# Patient Record
Sex: FEMALE | Race: WHITE | NOT HISPANIC OR LATINO | URBAN - METROPOLITAN AREA
[De-identification: names, ages, dates, MRNs, and addresses within clinical notes are randomized per-mention and may not be internally consistent; named-entity substitution may affect disease eponyms.]

---

## 2018-11-19 ENCOUNTER — EMERGENCY (EMERGENCY)
Facility: HOSPITAL | Age: 63
LOS: 1 days | Discharge: HOME | End: 2018-11-19
Attending: EMERGENCY MEDICINE
Payer: COMMERCIAL

## 2018-11-19 VITALS
TEMPERATURE: 98 F | SYSTOLIC BLOOD PRESSURE: 109 MMHG | RESPIRATION RATE: 18 BRPM | OXYGEN SATURATION: 100 % | HEART RATE: 70 BPM | DIASTOLIC BLOOD PRESSURE: 60 MMHG

## 2018-11-19 VITALS
WEIGHT: 145.95 LBS | OXYGEN SATURATION: 99 % | RESPIRATION RATE: 16 BRPM | TEMPERATURE: 98 F | SYSTOLIC BLOOD PRESSURE: 98 MMHG | HEIGHT: 67 IN | DIASTOLIC BLOOD PRESSURE: 60 MMHG

## 2018-11-19 DIAGNOSIS — Z90.49 ACQUIRED ABSENCE OF OTHER SPECIFIED PARTS OF DIGESTIVE TRACT: Chronic | ICD-10-CM

## 2018-11-19 LAB
ALBUMIN SERPL ELPH-MCNC: 4.2 G/DL — SIGNIFICANT CHANGE UP (ref 3.3–5.2)
ALP SERPL-CCNC: 68 U/L — SIGNIFICANT CHANGE UP (ref 40–120)
ALT FLD-CCNC: 100 U/L — HIGH
AMPHET UR-MCNC: NEGATIVE — SIGNIFICANT CHANGE UP
ANION GAP SERPL CALC-SCNC: 12 MMOL/L — SIGNIFICANT CHANGE UP (ref 5–17)
APAP SERPL-MCNC: <7.5 UG/ML — LOW (ref 10–26)
APPEARANCE UR: CLEAR — SIGNIFICANT CHANGE UP
AST SERPL-CCNC: 154 U/L — HIGH
BARBITURATES UR SCN-MCNC: NEGATIVE — SIGNIFICANT CHANGE UP
BASOPHILS # BLD AUTO: 0 K/UL — SIGNIFICANT CHANGE UP (ref 0–0.2)
BASOPHILS # BLD AUTO: 0 K/UL — SIGNIFICANT CHANGE UP (ref 0–0.2)
BASOPHILS NFR BLD AUTO: 0.1 % — SIGNIFICANT CHANGE UP (ref 0–2)
BENZODIAZ UR-MCNC: NEGATIVE — SIGNIFICANT CHANGE UP
BILIRUB SERPL-MCNC: 0.5 MG/DL — SIGNIFICANT CHANGE UP (ref 0.4–2)
BILIRUB UR-MCNC: NEGATIVE — SIGNIFICANT CHANGE UP
BUN SERPL-MCNC: 15 MG/DL — SIGNIFICANT CHANGE UP (ref 8–20)
CALCIUM SERPL-MCNC: 9.5 MG/DL — SIGNIFICANT CHANGE UP (ref 8.6–10.2)
CHLORIDE SERPL-SCNC: 102 MMOL/L — SIGNIFICANT CHANGE UP (ref 98–107)
CK SERPL-CCNC: 80 U/L — SIGNIFICANT CHANGE UP (ref 25–170)
CO2 SERPL-SCNC: 26 MMOL/L — SIGNIFICANT CHANGE UP (ref 22–29)
COCAINE METAB.OTHER UR-MCNC: NEGATIVE — SIGNIFICANT CHANGE UP
COLOR SPEC: YELLOW — SIGNIFICANT CHANGE UP
CREAT SERPL-MCNC: 0.66 MG/DL — SIGNIFICANT CHANGE UP (ref 0.5–1.3)
DIFF PNL FLD: ABNORMAL
EOSINOPHIL # BLD AUTO: 0 K/UL — SIGNIFICANT CHANGE UP (ref 0–0.5)
EOSINOPHIL # BLD AUTO: 0 K/UL — SIGNIFICANT CHANGE UP (ref 0–0.5)
EOSINOPHIL NFR BLD AUTO: 0.1 % — SIGNIFICANT CHANGE UP (ref 0–6)
EPI CELLS # UR: SIGNIFICANT CHANGE UP
ETHANOL SERPL-MCNC: <10 MG/DL — SIGNIFICANT CHANGE UP
GLUCOSE SERPL-MCNC: 140 MG/DL — HIGH (ref 70–115)
GLUCOSE UR QL: NEGATIVE MG/DL — SIGNIFICANT CHANGE UP
HCT VFR BLD CALC: 33.7 % — LOW (ref 37–47)
HCT VFR BLD CALC: 35.2 % — LOW (ref 37–47)
HGB BLD-MCNC: 11 G/DL — LOW (ref 12–16)
HGB BLD-MCNC: 11.8 G/DL — LOW (ref 12–16)
KETONES UR-MCNC: NEGATIVE — SIGNIFICANT CHANGE UP
LEUKOCYTE ESTERASE UR-ACNC: ABNORMAL
LYMPHOCYTES # BLD AUTO: 0.5 K/UL — LOW (ref 1–4.8)
LYMPHOCYTES # BLD AUTO: 0.6 K/UL — LOW (ref 1–4.8)
LYMPHOCYTES # BLD AUTO: 3 % — LOW (ref 20–55)
LYMPHOCYTES # BLD AUTO: 4 % — LOW (ref 20–55)
MAGNESIUM SERPL-MCNC: 1.8 MG/DL — SIGNIFICANT CHANGE UP (ref 1.6–2.6)
MCHC RBC-ENTMCNC: 30.9 PG — SIGNIFICANT CHANGE UP (ref 27–31)
MCHC RBC-ENTMCNC: 31.2 PG — HIGH (ref 27–31)
MCHC RBC-ENTMCNC: 32.6 G/DL — SIGNIFICANT CHANGE UP (ref 32–36)
MCHC RBC-ENTMCNC: 33.5 G/DL — SIGNIFICANT CHANGE UP (ref 32–36)
MCV RBC AUTO: 93.1 FL — SIGNIFICANT CHANGE UP (ref 81–99)
MCV RBC AUTO: 94.7 FL — SIGNIFICANT CHANGE UP (ref 81–99)
METHADONE UR-MCNC: NEGATIVE — SIGNIFICANT CHANGE UP
MONOCYTES # BLD AUTO: 0.7 K/UL — SIGNIFICANT CHANGE UP (ref 0–0.8)
MONOCYTES # BLD AUTO: 1.1 K/UL — HIGH (ref 0–0.8)
MONOCYTES NFR BLD AUTO: 5.7 % — SIGNIFICANT CHANGE UP (ref 3–10)
MONOCYTES NFR BLD AUTO: 6 % — SIGNIFICANT CHANGE UP (ref 3–10)
NEUTROPHILS # BLD AUTO: 11.8 K/UL — HIGH (ref 1.8–8)
NEUTROPHILS # BLD AUTO: 15.6 K/UL — HIGH (ref 1.8–8)
NEUTROPHILS NFR BLD AUTO: 87 % — HIGH (ref 37–73)
NEUTROPHILS NFR BLD AUTO: 89.9 % — HIGH (ref 37–73)
NEUTS BAND # BLD: 4 % — SIGNIFICANT CHANGE UP (ref 0–8)
NITRITE UR-MCNC: NEGATIVE — SIGNIFICANT CHANGE UP
OPIATES UR-MCNC: NEGATIVE — SIGNIFICANT CHANGE UP
PCP SPEC-MCNC: SIGNIFICANT CHANGE UP
PCP UR-MCNC: NEGATIVE — SIGNIFICANT CHANGE UP
PH UR: 5 — SIGNIFICANT CHANGE UP (ref 5–8)
PHOSPHATE SERPL-MCNC: 2.5 MG/DL — SIGNIFICANT CHANGE UP (ref 2.4–4.7)
PLAT MORPH BLD: NORMAL — SIGNIFICANT CHANGE UP
PLATELET # BLD AUTO: 227 K/UL — SIGNIFICANT CHANGE UP (ref 150–400)
PLATELET # BLD AUTO: 235 K/UL — SIGNIFICANT CHANGE UP (ref 150–400)
POTASSIUM SERPL-MCNC: 3.4 MMOL/L — LOW (ref 3.5–5.3)
POTASSIUM SERPL-SCNC: 3.4 MMOL/L — LOW (ref 3.5–5.3)
PROT SERPL-MCNC: 6.8 G/DL — SIGNIFICANT CHANGE UP (ref 6.6–8.7)
PROT UR-MCNC: 15 MG/DL
RBC # BLD: 3.56 M/UL — LOW (ref 4.4–5.2)
RBC # BLD: 3.78 M/UL — LOW (ref 4.4–5.2)
RBC # FLD: 13.5 % — SIGNIFICANT CHANGE UP (ref 11–15.6)
RBC # FLD: 13.8 % — SIGNIFICANT CHANGE UP (ref 11–15.6)
RBC BLD AUTO: SIGNIFICANT CHANGE UP
RBC CASTS # UR COMP ASSIST: SIGNIFICANT CHANGE UP /HPF (ref 0–4)
SALICYLATES SERPL-MCNC: <0.6 MG/DL — LOW (ref 10–20)
SODIUM SERPL-SCNC: 140 MMOL/L — SIGNIFICANT CHANGE UP (ref 135–145)
SP GR SPEC: 1.02 — SIGNIFICANT CHANGE UP (ref 1.01–1.02)
THC UR QL: NEGATIVE — SIGNIFICANT CHANGE UP
TROPONIN T SERPL-MCNC: <0.01 NG/ML — SIGNIFICANT CHANGE UP (ref 0–0.06)
UROBILINOGEN FLD QL: NEGATIVE MG/DL — SIGNIFICANT CHANGE UP
WBC # BLD: 13.1 K/UL — HIGH (ref 4.8–10.8)
WBC # BLD: 17.4 K/UL — HIGH (ref 4.8–10.8)
WBC # FLD AUTO: 13.1 K/UL — HIGH (ref 4.8–10.8)
WBC # FLD AUTO: 17.4 K/UL — HIGH (ref 4.8–10.8)
WBC UR QL: SIGNIFICANT CHANGE UP

## 2018-11-19 PROCEDURE — 96374 THER/PROPH/DIAG INJ IV PUSH: CPT

## 2018-11-19 PROCEDURE — 82962 GLUCOSE BLOOD TEST: CPT

## 2018-11-19 PROCEDURE — 93010 ELECTROCARDIOGRAM REPORT: CPT

## 2018-11-19 PROCEDURE — 99285 EMERGENCY DEPT VISIT HI MDM: CPT

## 2018-11-19 PROCEDURE — 96361 HYDRATE IV INFUSION ADD-ON: CPT

## 2018-11-19 PROCEDURE — 82550 ASSAY OF CK (CPK): CPT

## 2018-11-19 PROCEDURE — 85027 COMPLETE CBC AUTOMATED: CPT

## 2018-11-19 PROCEDURE — 81001 URINALYSIS AUTO W/SCOPE: CPT

## 2018-11-19 PROCEDURE — 71045 X-RAY EXAM CHEST 1 VIEW: CPT

## 2018-11-19 PROCEDURE — 70450 CT HEAD/BRAIN W/O DYE: CPT | Mod: 26

## 2018-11-19 PROCEDURE — 83735 ASSAY OF MAGNESIUM: CPT

## 2018-11-19 PROCEDURE — 71045 X-RAY EXAM CHEST 1 VIEW: CPT | Mod: 26

## 2018-11-19 PROCEDURE — 93005 ELECTROCARDIOGRAM TRACING: CPT

## 2018-11-19 PROCEDURE — 84100 ASSAY OF PHOSPHORUS: CPT

## 2018-11-19 PROCEDURE — 36415 COLL VENOUS BLD VENIPUNCTURE: CPT

## 2018-11-19 PROCEDURE — 84484 ASSAY OF TROPONIN QUANT: CPT

## 2018-11-19 PROCEDURE — 70450 CT HEAD/BRAIN W/O DYE: CPT

## 2018-11-19 PROCEDURE — 80053 COMPREHEN METABOLIC PANEL: CPT

## 2018-11-19 PROCEDURE — 80307 DRUG TEST PRSMV CHEM ANLYZR: CPT

## 2018-11-19 PROCEDURE — 99284 EMERGENCY DEPT VISIT MOD MDM: CPT | Mod: 25

## 2018-11-19 RX ORDER — LEVETIRACETAM 250 MG/1
1000 TABLET, FILM COATED ORAL ONCE
Qty: 0 | Refills: 0 | Status: COMPLETED | OUTPATIENT
Start: 2018-11-19 | End: 2018-11-19

## 2018-11-19 RX ORDER — SODIUM CHLORIDE 9 MG/ML
1000 INJECTION INTRAMUSCULAR; INTRAVENOUS; SUBCUTANEOUS ONCE
Qty: 0 | Refills: 0 | Status: COMPLETED | OUTPATIENT
Start: 2018-11-19 | End: 2018-11-19

## 2018-11-19 RX ORDER — LEVETIRACETAM 250 MG/1
1 TABLET, FILM COATED ORAL
Qty: 60 | Refills: 0 | OUTPATIENT
Start: 2018-11-19 | End: 2018-12-18

## 2018-11-19 RX ORDER — CETIRIZINE HYDROCHLORIDE 10 MG/1
0 TABLET ORAL
Qty: 0 | Refills: 0 | COMMUNITY

## 2018-11-19 RX ADMIN — SODIUM CHLORIDE 1000 MILLILITER(S): 9 INJECTION INTRAMUSCULAR; INTRAVENOUS; SUBCUTANEOUS at 14:01

## 2018-11-19 RX ADMIN — SODIUM CHLORIDE 2000 MILLILITER(S): 9 INJECTION INTRAMUSCULAR; INTRAVENOUS; SUBCUTANEOUS at 14:03

## 2018-11-19 RX ADMIN — LEVETIRACETAM 400 MILLIGRAM(S): 250 TABLET, FILM COATED ORAL at 16:24

## 2018-11-19 RX ADMIN — SODIUM CHLORIDE 1000 MILLILITER(S): 9 INJECTION INTRAMUSCULAR; INTRAVENOUS; SUBCUTANEOUS at 15:03

## 2018-11-19 RX ADMIN — SODIUM CHLORIDE 1000 MILLILITER(S): 9 INJECTION INTRAMUSCULAR; INTRAVENOUS; SUBCUTANEOUS at 13:01

## 2018-11-19 NOTE — ED PROVIDER NOTE - PROGRESS NOTE DETAILS
Will recheck CBC s/p 2L NS. Patient has been asymptomatic during her ED stay. I discussed with Dr. Abreu who is comfortable with outpatient work up, will start patient on keppra, send Rx to pharmacy and I discussed with patient, daughter and mother who are all at bedside the importance of prompt follow up and compliance with medication. Repeat CBC shows improvement of WBC, still likely elevated due to stress reaction as there is no source of infection. Patient will be discharged home with follow up to Dr. Abreu tomorrow.

## 2018-11-19 NOTE — ED PROVIDER NOTE - OBJECTIVE STATEMENT
Patient with PMH migraine LEONARDO's presents complaining of 2 siezure-like episodes this morning which was witnessed by her mother who is at bedside. Patient states that this morning she felt "off" and complained of feeling dizzy. She went to sit down in the living room when her mother noticed that she stopped responding, her arms went straight out in front of her and started twisting, which lasted seconds. She did not fall as she was on the couch, but was noted to be sliding down the couch. She was repositioned by her daughter at that time when another episode occurred. She did not bit her tongue or lose control of her bladder or bowels. She felt "cloudy" afterwards, but was responsive and answering questions almost immediately afterwards. She denies any similar episodes in the past, takes only Excedrin for migraines (last dose was 2 days ago), denies any allergies to medications, denies smoking, EtOH or illicit drugs. She states she usually has to drive to [place] daily, but today she did not.

## 2018-11-19 NOTE — ED PROVIDER NOTE - PHYSICAL EXAMINATION
Const: Awake, alert and oriented. In no acute distress. Well appearing.  HEENT: NC/AT. Moist mucous membranes. No intraoral lacerations or abrasions.  Eyes: No scleral icterus. EOMI.  Neck:. Soft and supple. Full ROM without pain.  Cardiac: Regular rate and regular rhythm. +S1/S2. No murmurs. Peripheral pulses 2+ and symmetric. No LE edema.  Resp: Speaking in full sentences. No evidence of respiratory distress. No wheezes, rales or rhonchi.  Abd: Soft, non-tender, non-distended. Normal bowel sounds in all 4 quadrants. No guarding or rebound.  Back: Spine midline and non-tender. No CVAT.  Skin: No rashes, abrasions or lacerations.  Lymph: No cervical lymphadenopathy.  Neuro: CN II-XII grossly in tact. Symmetrical smile. PERRL. EOMI. Bilateral and symmetric sensation of face. Tongue midline. Normal finger to nose. Normal heel to shin. Normal rapid alternating movements. No pronator drift. Normal gait without assistance. Sensation symmetrically intact bilateral upper and lower extremities. Reflexes 2+ bilaterally. Babinski negative bilaterally.

## 2018-11-19 NOTE — ED ADULT NURSE NOTE - NSIMPLEMENTINTERV_GEN_ALL_ED
Implemented All Universal Safety Interventions:  [place] to call system. Call bell, personal items and telephone within reach. Instruct patient to call for assistance. Room bathroom lighting operational. Non-slip footwear when patient is off stretcher. Physically safe environment: no spills, clutter or unnecessary equipment. Stretcher in lowest position, wheels locked, appropriate side rails in place. Implemented All Fall Risk Interventions:  [place] to call system. Call bell, personal items and telephone within reach. Instruct patient to call for assistance. Room bathroom lighting operational. Non-slip footwear when patient is off stretcher. Physically safe environment: no spills, clutter or unnecessary equipment. Stretcher in lowest position, wheels locked, appropriate side rails in place. Provide visual cue, wrist band, yellow gown, etc. Monitor gait and stability. Monitor for mental status changes and reorient to person, place, and time. Review medications for side effects contributing to fall risk. Reinforce activity limits and safety measures with patient and family.

## 2018-11-19 NOTE — ED PROVIDER NOTE - NS ED ROS FT
Const: Denies fever, chills  HEENT: Denies blurry vision, sore throat  Neck: Denies neck pain/stiffness  Resp: Denies coughing, SOB  Cardiovascular: Denies CP, palpitations, LE edema  GI: Denies nausea, vomiting, abdominal pain, diarrhea, constipation, blood in stool  : Denies urinary frequency/urgency/dysuria, hematuria  MSK: Denies back pain  Neuro: + HA (last 2 days ago), + dizziness (resolved), + seizure-like activity. Denies numbness, weakness  Skin: Denies rashes.

## 2018-11-19 NOTE — ED ADULT NURSE NOTE - OBJECTIVE STATEMENT
pt reports sudden onset of dizziness, hands and feet tingling, "I knew something was going to happen" before having episode of stiffness, arms twisting outwards lasting few seconds. when episode ended pt was able to speak. pt presenting with headache at this time, denies headache earlier this morning prior to episode.

## 2018-11-19 NOTE — ED PROVIDER NOTE - MEDICAL DECISION MAKING DETAILS
Patient presents with 2 witnessed seizure-like activity with stiffness and twisting of the arms, but no tongue biting or loss of bladder/bowel control, no post-ictal period and no history. Neuro intact. Will CT head to r/o intracranial mass as cause of new onset seizure, but also check EKG and cardiac labs to r/o cardiac etiology of syncope, as well as drug screen and co-ingestants to r/o other cause of new onset seizure.

## 2018-11-19 NOTE — ED ADULT TRIAGE NOTE - CHIEF COMPLAINT QUOTE
Pt comes to ed from home for reported seizures x2. did not fall or hit head. no loc. patient reports feeling weakness in right arm since friday. patient also reports dizziness prior to seizure like episodes.

## 2018-12-13 ENCOUNTER — APPOINTMENT (OUTPATIENT)
Dept: NEUROSURGERY | Facility: CLINIC | Age: 63
End: 2018-12-13
Payer: COMMERCIAL

## 2018-12-13 ENCOUNTER — TRANSCRIPTION ENCOUNTER (OUTPATIENT)
Age: 63
End: 2018-12-13

## 2018-12-13 DIAGNOSIS — I67.1 CEREBRAL ANEURYSM, NONRUPTURED: ICD-10-CM

## 2018-12-13 DIAGNOSIS — Z87.891 PERSONAL HISTORY OF NICOTINE DEPENDENCE: ICD-10-CM

## 2018-12-13 DIAGNOSIS — R56.9 UNSPECIFIED CONVULSIONS: ICD-10-CM

## 2018-12-13 PROCEDURE — 99204 OFFICE O/P NEW MOD 45 MIN: CPT

## 2018-12-13 RX ORDER — LEVETIRACETAM 500 MG/1
500 TABLET, FILM COATED ORAL
Refills: 0 | Status: ACTIVE | COMMUNITY

## 2019-03-16 ENCOUNTER — TRANSCRIPTION ENCOUNTER (OUTPATIENT)
Age: 64
End: 2019-03-16

## 2023-02-17 ENCOUNTER — NON-APPOINTMENT (OUTPATIENT)
Age: 68
End: 2023-02-17

## 2023-02-19 ENCOUNTER — INPATIENT (INPATIENT)
Facility: HOSPITAL | Age: 68
LOS: 1 days | Discharge: HOME | DRG: 125 | End: 2023-02-21
Attending: NEUROLOGICAL SURGERY | Admitting: NEUROLOGICAL SURGERY
Payer: MEDICARE

## 2023-02-19 VITALS
TEMPERATURE: 99 F | RESPIRATION RATE: 16 BRPM | HEART RATE: 81 BPM | HEIGHT: 67 IN | SYSTOLIC BLOOD PRESSURE: 131 MMHG | DIASTOLIC BLOOD PRESSURE: 64 MMHG | OXYGEN SATURATION: 99 % | WEIGHT: 145.06 LBS

## 2023-02-19 DIAGNOSIS — Z90.49 ACQUIRED ABSENCE OF OTHER SPECIFIED PARTS OF DIGESTIVE TRACT: Chronic | ICD-10-CM

## 2023-02-19 LAB
ALBUMIN SERPL ELPH-MCNC: 3.8 G/DL — SIGNIFICANT CHANGE UP (ref 3.3–5.2)
ALP SERPL-CCNC: 78 U/L — SIGNIFICANT CHANGE UP (ref 40–120)
ALT FLD-CCNC: 17 U/L — SIGNIFICANT CHANGE UP
ANION GAP SERPL CALC-SCNC: 11 MMOL/L — SIGNIFICANT CHANGE UP (ref 5–17)
AST SERPL-CCNC: 22 U/L — SIGNIFICANT CHANGE UP
BASOPHILS # BLD AUTO: 0.03 K/UL — SIGNIFICANT CHANGE UP (ref 0–0.2)
BASOPHILS NFR BLD AUTO: 0.4 % — SIGNIFICANT CHANGE UP (ref 0–2)
BILIRUB SERPL-MCNC: <0.2 MG/DL — LOW (ref 0.4–2)
BUN SERPL-MCNC: 14.1 MG/DL — SIGNIFICANT CHANGE UP (ref 8–20)
CALCIUM SERPL-MCNC: 8.9 MG/DL — SIGNIFICANT CHANGE UP (ref 8.4–10.5)
CHLORIDE SERPL-SCNC: 104 MMOL/L — SIGNIFICANT CHANGE UP (ref 96–108)
CO2 SERPL-SCNC: 25 MMOL/L — SIGNIFICANT CHANGE UP (ref 22–29)
CREAT SERPL-MCNC: 0.74 MG/DL — SIGNIFICANT CHANGE UP (ref 0.5–1.3)
CRP SERPL-MCNC: <4 MG/L — SIGNIFICANT CHANGE UP
EGFR: 89 ML/MIN/1.73M2 — SIGNIFICANT CHANGE UP
EOSINOPHIL # BLD AUTO: 0.11 K/UL — SIGNIFICANT CHANGE UP (ref 0–0.5)
EOSINOPHIL NFR BLD AUTO: 1.5 % — SIGNIFICANT CHANGE UP (ref 0–6)
ERYTHROCYTE [SEDIMENTATION RATE] IN BLOOD: 20 MM/HR — SIGNIFICANT CHANGE UP (ref 0–20)
GLUCOSE SERPL-MCNC: 109 MG/DL — HIGH (ref 70–99)
HCT VFR BLD CALC: 34.9 % — SIGNIFICANT CHANGE UP (ref 34.5–45)
HGB BLD-MCNC: 11.7 G/DL — SIGNIFICANT CHANGE UP (ref 11.5–15.5)
IMM GRANULOCYTES NFR BLD AUTO: 0.1 % — SIGNIFICANT CHANGE UP (ref 0–0.9)
LYMPHOCYTES # BLD AUTO: 2.23 K/UL — SIGNIFICANT CHANGE UP (ref 1–3.3)
LYMPHOCYTES # BLD AUTO: 30.5 % — SIGNIFICANT CHANGE UP (ref 13–44)
MCHC RBC-ENTMCNC: 31.3 PG — SIGNIFICANT CHANGE UP (ref 27–34)
MCHC RBC-ENTMCNC: 33.5 GM/DL — SIGNIFICANT CHANGE UP (ref 32–36)
MCV RBC AUTO: 93.3 FL — SIGNIFICANT CHANGE UP (ref 80–100)
MONOCYTES # BLD AUTO: 0.68 K/UL — SIGNIFICANT CHANGE UP (ref 0–0.9)
MONOCYTES NFR BLD AUTO: 9.3 % — SIGNIFICANT CHANGE UP (ref 2–14)
NEUTROPHILS # BLD AUTO: 4.25 K/UL — SIGNIFICANT CHANGE UP (ref 1.8–7.4)
NEUTROPHILS NFR BLD AUTO: 58.2 % — SIGNIFICANT CHANGE UP (ref 43–77)
PLATELET # BLD AUTO: 270 K/UL — SIGNIFICANT CHANGE UP (ref 150–400)
POTASSIUM SERPL-MCNC: 3.6 MMOL/L — SIGNIFICANT CHANGE UP (ref 3.5–5.3)
POTASSIUM SERPL-SCNC: 3.6 MMOL/L — SIGNIFICANT CHANGE UP (ref 3.5–5.3)
PROT SERPL-MCNC: 6.5 G/DL — LOW (ref 6.6–8.7)
RBC # BLD: 3.74 M/UL — LOW (ref 3.8–5.2)
RBC # FLD: 13.8 % — SIGNIFICANT CHANGE UP (ref 10.3–14.5)
SODIUM SERPL-SCNC: 140 MMOL/L — SIGNIFICANT CHANGE UP (ref 135–145)
WBC # BLD: 7.31 K/UL — SIGNIFICANT CHANGE UP (ref 3.8–10.5)
WBC # FLD AUTO: 7.31 K/UL — SIGNIFICANT CHANGE UP (ref 3.8–10.5)

## 2023-02-19 PROCEDURE — G1004: CPT

## 2023-02-19 PROCEDURE — 70496 CT ANGIOGRAPHY HEAD: CPT | Mod: 26,MG

## 2023-02-19 PROCEDURE — 70498 CT ANGIOGRAPHY NECK: CPT | Mod: 26,MG

## 2023-02-19 PROCEDURE — 99285 EMERGENCY DEPT VISIT HI MDM: CPT

## 2023-02-19 NOTE — ED ADULT NURSE REASSESSMENT NOTE - NS ED NURSE REASSESS COMMENT FT1
pt continues to remain ambulatory around ED with steady gait. BRICEÑO with strength and purpose, even and unlabored resps present, neuro sx at bedside and recommending admission. pt with no change in mental status since arrival to ED. no chest pain, no ataxia, no slurred speech, neuro intact.
Ct completed and neuro sx consulted for eval prior to potential DC. pt is awake alert and oriented, no change in mental status, ambulatory to bathroom with steady gait. even and unlabored resps present, aware of plan of care. family at bedside, will monitor.

## 2023-02-19 NOTE — ED PROVIDER NOTE - ATTENDING CONTRIBUTION TO CARE
Pt states that she has a hx of L ICA aneurysm.  Pt states that she was diagnosed a few years ago and was supposed to get repeated follow-up for this but has yet to follow-up. Pt states that over the past year she has had gradually worsening decreased visual acuity in the L eye. Pt states that she made an appt with dr. mark for tuesday but has been having headaches for the past couple of day so she came to the ED.    physical - rrr. ctab. abd - soft, nt. no edema. no rash. neuro CN III-XII intact. normal gait. strength 5/5 x4. sensation intact x4.      plan - labs and imaging reviewed. L ICA aneurysm stable as compared to prior MRA.  Pt with progressively worsening vision in the L eye nothing acutely worse today.  neuro exam otherwise intact. no headache currently. ESR neg rules out temporal arteritis. Pt instructed to keep neurosurgery appt as well as f/up with ophtho and neurology.  Pt given a copy of all results and instructed to f/up with pcp regarding any abnormal results. Pt states that she has a hx of L ICA aneurysm.  Pt states that she was diagnosed a few years ago and was supposed to get repeated follow-up for this but has yet to follow-up. Pt states that over the past year she has had gradually worsening decreased visual acuity in the L eye. Pt states that she made an appt with dr. mark for tuesday but has been having headaches for the past couple of day so she came to the ED.    physical - rrr. ctab. abd - soft, nt. no edema. no rash. neuro CN III-XII intact. normal gait. strength 5/5 x4. sensation intact x4.      plan - labs and imaging reviewed. L ICA aneurysm stable as compared to prior MRA.  Pt with progressively worsening vision in the L eye nothing acutely worse today.  neurosurgery evaluated patient and recommends admission for MRI/MRA/angiography.  discussed with patient and amenable to admission.

## 2023-02-19 NOTE — ED ADULT NURSE NOTE - OBJECTIVE STATEMENT
pt ambulatory into ED with reports of headache and change in vision over the course of a year. pt with reports of known aneurysm, dx in 2019 but has not followed up. no injury no falls no reports of ataxia or diff speaking/ no neuro deficits, just reporting that her vision changed from "20/40 to 20/250 in a year." pt with steady gait, PERRLA x 2, no distress

## 2023-02-19 NOTE — ED ADULT TRIAGE NOTE - CHIEF COMPLAINT QUOTE
Patient ambulated into ED with steady gait, Pt c/o vision changes in left eye 20/40 to 20/250 in 1 year, noticed the change a few weeks ago also having on and off pain to left arm and leg, right sided headache on and off, pt has known aneurism on left side.

## 2023-02-19 NOTE — ED PROVIDER NOTE - NSFOLLOWUPINSTRUCTIONS_ED_ALL_ED_FT
-Follow up with Dr. Rhodes and bring these test results with you  -Return with any new/worse/concerning symptoms    Headache    A headache is pain or discomfort felt around the head or neck area. The specific cause of a headache may not be found as there are many types including tension headaches, migraine headaches, and cluster headaches. Watch your condition for any changes. Things you can do to manage your pain include taking over the counter and prescription medications as instructed by your health care provider, lying down in a dark quiet room, limiting stress, getting regular sleep, and refraining from alcohol and tobacco products.    SEEK IMMEDIATE MEDICAL CARE IF YOU HAVE ANY OF THE FOLLOWING SYMPTOMS: fever, vomiting, stiff neck, loss of vision, problems with speech, muscle weakness, loss of balance, trouble walking, passing out, or confusion.

## 2023-02-19 NOTE — ED PROVIDER NOTE - PROGRESS NOTE DETAILS
Mack: rechecked the patient. updated her on lab results. CTA pending. Mack: case discussed with nsgustavo Chew. Case will be d/w Dr. Jara.

## 2023-02-19 NOTE — ED PROVIDER NOTE - PHYSICAL EXAMINATION
General: well appearing, NAD  Head: NC, AT  EENT: EOMI, no scleral icterus, PERRL  Cardiac: RRR, no apparent murmurs, no lower extremity edema  Respiratory: CTABL, no respiratory distress   Abdomen: soft, ND, NT, nonperitonitic  MSK/Vascular: full ROM, soft compartments, warm extremities  Neuro: AAOx3, sensation to light touch intact all 4 extremities, normal FNF, 5/5 strength all 4 extremities  Psych: calm, cooperative

## 2023-02-19 NOTE — ED ADULT TRIAGE NOTE - SPO2 (%)
Patient education: Migraine headaches in adults       MIGRAINE HEADACHE OVERVIEW -- Headaches can be quite debilitating, although the vast majority are not due to life-threatening disorders. Approximately 90 percent of headaches are caused by one of three syndromes:   ?Migraine headache  ? Tension-type headaches  ? Cluster headaches  This article discusses migraine headaches in adults. MIGRAINE HEADACHE SYMPTOMS -- Between 12 and 16 percent of people in the [place] experience migraine headaches, making it the second most common type of headache. Pain -- The pain of a migraine headache usually begins gradually, intensifies over minutes to one or more hours, and resolves gradually at the end of the attack. The headache is typically dull, deep, and steady when mild to moderate in severity; it becomes throbbing or pulsatile when severe. Migraine headaches are worsened by light, sneezing, straining, constant motion, moving the head rapidly, or physical activity. Many migraine sufferers try to get relief by lying down in a darkened, quiet room. In 60 to 70 percent of people, the pain occurs on only one side of the head. In adults, a migraine headache usually lasts a few hours, although it can last from four to 72 hours. Other symptoms -- Migraine headaches are often accompanied by nausea and vomiting, as well as sensitivity to light and noise. Between 10 and 20 percent of people with migraines also experience nasal stuffiness and runny nose, or teary eyes. The symptoms of a migraine attack may be severe and alarming, but in most cases there are no lasting health effects when the attack ends. Aura -- About 20 percent of people with migraines experience symptoms before the headache; this is called an aura. The aura may include flashing lights or bright spots, zigzag lines, changes in vision, or numbness or tingling in the fingers of one hand, lips, tongue, or lower face.  You may have one or more of these aura symptoms. Auras may also involve other senses and can occasionally cause temporary muscle weakness or changes in speech; these symptoms can be frightening. Aura symptoms typically last five to 20 minutes and rarely last more than 60 minutes. The headache occurs soon after the aura stops. Muscle-related auras may last longer. MIGRAINE HEADACHE TRIGGERS -- Migraines can be triggered by stress, worry, menstrual periods, birth control pills, physical exertion, fatigue, lack of sleep, hunger, head trauma, and certain foods or drinks that contain chemicals such as nitrites, glutamate, aspartate, tyramine. Certain medications and chemicals can also trigger a migraine, including nitroglycerin (used to treat chest pain), estrogens, hydralazine (used to treat high blood pressure), perfumes, smoke, and organic solvents with a strong odor. Headache diary -- People who have frequent or severe headaches may benefit from keeping a headache diary over the course of one month. This can be used to determine what triggers the migraines and what makes them better. MIGRAINE HEADACHE TREATMENT TYPES -- Migraine headache treatment depends upon the frequency, severity, and symptoms of your headache. ? Acute treatment refers to medicines you can take when you have a headache to relieve the pain immediately. ?Preventive treatment refers to medicines you can take on a regular (usually daily) basis to prevent headaches in the future. Acute treatment -- The pain of migraines can be tough to get rid of. Treatment is most likely to work if you take it at the first sign of an attack (eg, at the first sign of aura if one occurs, or when pain begins). In some people, an aura occurs before the migraine (see 'Aura' above). Therefore, an aura can serve as a reliable warning that a migraine headache is on the way, and should be the signal to take migraine medication. (See \"Acute treatment of migraine in adults\". )  Pain relievers -- Mild migraine attacks may respond to pain relievers, some of which are available without a prescription. These drugs include:  ? Aspirin  ? Acetaminophen  ? Nonsteroidal anti-inflammatory drugs (NSAIDs) such as ibuprofen, indomethacin, or naproxen  ? Indomethacin is a prescription medicine that comes in a rectal suppository, which may be useful for people who have nausea during their headaches. Pain relievers are also available in combination with caffeine, which enhances their antimigraine effect. As an example, some pain relievers contain a combination of acetaminophen, aspirin, and caffeine. Pain relievers are often recommended first for mild to moderate migraine attacks. However, they should not be used too often because overuse can lead to medication-overuse headaches or chronic daily headaches. If you respond to a pain reliever, continue taking it with each attack, as long as you do not take it more than once or twice per week. People with gastritis (inflammation of the stomach), ulcers, kidney disease, and bleeding conditions should not take products containing aspirin or NSAIDs. Anti-nausea medications -- If you have nausea and vomiting with a migraine, you can take an anti-nausea medicine by injection or rectal suppository. In some cases, antimigraine drugs can be taken in combination with drugs that alleviate nausea and vomiting, such as metoclopramide or prochlorperazine. Anti-nausea medications given by mouth are usually used in combination with other medications to treat acute migraine. However, anti-nausea medications can be given alone in the hospital by intravenous and intramuscular administration to treat acute migraine headache. Triptans -- If a pain reliever does not control your migraine pain, most healthcare providers will recommend a treatment that is migraine-specific. This includes a class of medications called triptans.  Examples of triptans are sumatriptan, zolmitriptan, naratriptan, rizatriptan, almotriptan, eletriptan, and frovatriptan. Triptans can be used at home or work/school, and are all available in an oral (pill) form. Sumatriptan and zolmitriptan are available as nasal sprays, and sumatriptan is available as an injection. People with familial hemiplegic migraine, basilar migraine, uncontrolled high blood pressure, vascular disease (including ischemic stroke and coronary artery disease), Prinzmetal's angina, pregnancy, and severe kidney or liver disease should not take triptans in most cases. ?Sumatriptan -- Sumatriptan is available in many different formulas, including a tablet, nasal spray, and injection. Over 70 percent of people get pain relief within one hour of injecting sumatriptan; by two hours, 90 percent of people notice improvement. Your healthcare provider can help to decide which formula (pill, nasal spray, or shot) is best for you. Common side effects of injectable sumatriptan include pain at the injection site, dizziness, a feeling of warmth, and tingling in the arms or legs. Most of these reactions occur soon after the injection and resolve within 30 minutes. These drugs are safe for most patients. Sumatriptan nasal spray begins to work faster than the pill form and has fewer side effects than the injection. The most common side effect of the nasal spray is an unpleasant taste. A tablet that contains a combination of sumatriptan-naproxen appears to be more effective than each medication taken alone. It is not known if taking the two medications separately would be as effective as the combination tablet. Ergots -- Ergotamine is an older, migraine-specific drug. It is often combined with caffeine. Ergots are not usually as effective as triptans and are more likely to cause side effects. Ergots are sometimes recommended for people with migraines of a long duration (greater than 48 hours) or that frequently recur.  People with high blood pressure, coronary artery disease, or kidney or liver disease should not use ergotamines. Dihydroergotamine is related to ergotamine, and can be taken by nasal spray for mild or moderate migraine attacks. It can also be given by injection for severe attacks. Other medications -- Other medications for migraine are not as well studied or are less effective. A small percentage of people with migraine headaches do not respond to routine acute treatments and may require additional treatment for pain. Dexamethasone is a glucocorticoid (steroid) medication that can be given by injection, along with another acute migraine treatment, to reduce the risk of a migraine coming back. Dexamethasone injections may be given in an emergency department or clinic. Latest medications for acute treatment of migraine:    Ulbrelvy-CGRP inhibitor It is the first in a new class of medications known as oral CGRP (calcitonin gene related peptide). It is non-narcotic, oral treatment specifically designed to help relieve migraine pain plus its most bothersome symptoms (light sensitivity, sound sensitivity, and nausea). Nurtec-CGRP inhibitor  Reyvow is a serotonin 5-HT1F receptor agnoist. Do not administer unless patient can wait at least 8 hours between dosing and operating heavy machinery or driving. Consider use if triptans are contraindicated (cardiovascular risk factors), ineffective or poorly tolerated. Elyxyb oral solution. It is a EMMANUEL-2 selective NSAID, analgesic, nonopioid       Preventive treatment -- Preventive treatment effectively controls migraine headaches in most people, although the benefits of this treatment may not be evident for three to four weeks, or sometimes up to 3 months. In some cases, both acute treatment and preventive treatment are necessary to adequately control migraines. Beta blockers -- Beta blockers were originally developed to treat high blood pressure.  In addition, beta blockers reduce the frequency of migraine attacks in 60 to 80 percent of people. Commonly used beta blockers include propranolol, nadolol, atenolol, and metoprolol. Beta blockers may cause depression in some people or impotence in some men. Antidepressant medications -- Tricyclic antidepressants (TCAs) and certain other antidepressant medications are often recommended for migraine prevention. These include amitriptyline, nortriptyline, and doxepin. Of these, amitriptyline has proven benefit for migraine prevention, while there is less data for the other tricyclics. Side effects are common with tricyclic antidepressants. Most of these drugs cause drowsiness, particularly amitriptyline and doxepin. Therefore, these drugs are usually taken at bedtime and started at a low dose. Additional side effects of tricyclics can include dry mouth, constipation, palpitations, weight gain, blurred vision, and urinary retention. Confusion can occur, particularly in older adults. Anti-seizure medications -- The anti-seizure medications valproate (also called divalproex), gabapentin, and topiramate are sometimes used to prevent migraines. ?Valproate is an anti-seizure drug that seems to work as well as beta blockers for preventing migraine, and may be better tolerated. However, valproate can cause weight gain and hair loss. Women who are pregnant or sexually active and not using birth control (pills, condoms, etc) should not take valproate. ?Gabapentin was effective for reducing migraine headache frequency in a small clinical trial. Potential side effects include lightheadedness, drowsiness, dizziness, and balance problems. ?Topiramate is an anti-seizure drug that can help to prevent migraine. It can cause mild to moderate side effects that may include abnormal sensations (often tingling), fatigue, nausea, changes in taste, loss of appetite, diarrhea, and weight loss. More severe side effects can occur, including difficulty with thinking and concentration.   Calcium channel blockers -- Calcium channel blockers were developed to treat high blood pressure. Calcium channel blockers are widely used for migraine prevention. Examples of calcium channel blockers include verapamil and nifedipine extended-release. Verapamil is frequently used as a first choice for preventive migraine therapy because it is easy to use and has few side effects. Calcium channel blockers may lose their effectiveness over time, but this can sometimes be remedied by taking a higher dose of the drug or switching to a similar drug. Herbal therapies -- Herbal therapies have been evaluated for the treatment of migraine headache, including feverfew and butterbur. Of these, feverfew has been the most widely studied. Some studies have found it to be effective for migraine prevention, although most experts agree that the benefits are still unproven. Neither treatment is recommended. (Magnesium and Riboflavin) There is a non-prescription formula called Migrelief that combines, Magnesium, Riboflavin, and Fever Few available. Botulinum toxin A has been shown to be effective in prevention of chronic migraine. Indication  BOTOX® is a prescription medicine that is injected to prevent headaches in adults with chronic migraine who have 15 or more days each month with headache lasting 4 or more hours each day in people 18 years or older. It is a neuromuscular blocker agent. The latest migraine preventatives:    [address] is a type of preventative treatment that works by blocking Calcitonin Gene-Related Peptide (CGRP) receptor blocker. It is contraindicated in pregnancy and in allergies to latex or rubber. [address] is a self-injection that is taken as 1 dose, once a month. Injections are administered using the TuneCore autoinjector. Emgality was specifically developed to bind to CGRP, a substance in the brain that may play a key role in migraine.  Emgality is a humanized monoclonal antibody that binds to the CGRP ligand and blocks its binding to the receptor. Ajovy is a monoclonal antibody that selectively targets the CGRP ligand. All of the CGRP monoclonal Ab are contraindicated in pregnancy. Tin Deter is a CGRP receptor blocker. Vyepti binds to CGRP ligand and blocks the receptor. It is an IV infusion. It is infused over 30 minutes every 3 months. Nurtec which is used for acute migraines can also be used as a preventative. Alisonaby Sawyer is a CGRP receptor agonist    Other preventative agents:  Magnesium 400 mg each day  Riboflavin 300 mg each day  Butterbur  CoQ10 100 mg three times per day       Behaviorally, stress management strategies, such as exercise, relaxation techniques, biofeedback mechanisms, and other therapies designed to limit daily discomfort, may reduce the number and severity of migraine attacks. Avoiding medication overuse -- It is essential to use antimigraine medications according to the prescription and clinician's instructions. Overuse of certain medications for migraine, including over-the-counter drugs such as acetaminophen and nonsteroidal antiinflammatory drugs, or prescription drugs such as triptans, can lead to medication-overuse headaches (also called rebound headaches) and to a pattern of daily headaches that require increasing quantities of drugs for relief. A vicious cycle occurs when frequent headaches cause you to take medications, which then cause rebound headaches as the medications wear off, causing you to take more medication, and so on. Speak with a healthcare provider if your migraine treatment does not relieve your headaches or if you are having unpleasant medication side effects. Switching to another drug or switching from acute treatment to preventive treatment may be helpful. MENSTRUAL MIGRAINES -- Migraines occur about three times more commonly in women than in men.  Estrogen has a variable effect on the frequency and severity of a woman's migraines; some women who take birth control pills (which contain estrogen) or hormone replacement therapy experience worsening headaches, while others improve. Similarly, some women have more frequent or severe headaches during pregnancy while others have improvement. Menstrual migraines are migraine headaches that occur around the beginning of a woman's menstrual period (usually two days before to three days after the period begins). Women with menstrual migraine may also have migraines at other times during the month. Most often, there is no migraine aura associated with menstrual migraines, even if the woman usually has aura at other times. Menstrual migraines are thought to be triggered by the normal decrease in estrogen levels that occurs before the menstrual period begins. Menstrual migraines tend to be longer lasting, more severe, and more resistant to treatment than other types of migraine. Treatment -- Initial treatment of acute menstrual migraine is the same as treatment for migraine occurring at any other time. Preventive therapies for menstrual migraine can be either nonspecific (those that do not address the hormonal trigger) or specific (hormone-based treatments). With nonspecific strategies, success requires accurate anticipation of menses for scheduling interventions; therefore, women with irregular cycles are not good candidates for these options. Coexisting problems, such as dysmenorrhea, menorrhagia, endometriosis, as well as contraception needs may influence choice of preventive therapy. A preventive treatment may be useful for women who have menstrual migraines on a predictable schedule. This treatment strategy is called \"mini-prophylaxis\". ?Nonsteroidal antiinflammatory drugs (NSAIDs) such as ibuprofen or naproxen are one option for mini-prophylaxis of menstrual migraine. ?Triptans such as frovatriptan, sumatriptan, or naratriptan are another option.  Typically, long-acting triptans are dosed twice daily beginning two days before anticipated menses and continued for five days. Hormonal treatments may be recommended to prevent menstrual migraines. One approach is to use estrogen-progestin contraceptive pills in an extended cycle; another choice is menstrually-targeted supplemental estrogen. These treatments work by preventing a rapid decline in the level of estrogen in the body before the menstrual period, which is believed to trigger the migraine. However, some experts avoid treatment with estrogen-progestin contraceptives for women who have a menstrual migraine with aura. Others consider the use of such treatment only for healthy women younger than age 28 who do not have focal neurologic signs and who do not smoke. WHERE TO GET MORE INFORMATION -- Your healthcare provider is the best source of information for questions and concerns related to your medical problem    Organizations  [employer] Head[employer] Foundation  Non-profit organization dedicated to service headache sufferers, their families, and the healthcare practitioners who treat them. Promotes research into headache causes and treatments and educates the public. [address]   Uriel@[email or web address] org   [employer] Radha Hema   Tel: [phone])   Fax: [phone]   [address]  Assists migraine sufferers by providing information and support and by raising money to fund innovative research into its causes and better treatments. [address] [facility] [email or web address]   [email or web address]. [email or web address]   Tel: [phone]   Fax: [phone] Headache Society Committee for Headache Education (ACHE)  The American Headache Society Committee on Headache Education (ACHE) is a nonprofit patient-health professional partnership dedicated to advancing the treatment and management of patients with headache. [address] [facility]. [place], [address]   [email or web address]   [employer] org   Tel: [phone]

## 2023-02-19 NOTE — ED PROVIDER NOTE - CLINICAL SUMMARY MEDICAL DECISION MAKING FREE TEXT BOX
68 year old female with hx of carotid artery aneurysm (5-6 mm in 2019), seizure disorder (off AEDs), and allergies who presents with headaches. Labs WNL, ESR pending. CT head non con unremarkable. CTA *** 68 year old female with hx of carotid artery aneurysm (5-6 mm in 2019), seizure disorder (off AEDs), and allergies who presents with headaches. Labs WNL, ESR pending. CT head non con unremarkable. 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 Will consult neurosurgery for further evaluation

## 2023-02-19 NOTE — ED PROVIDER NOTE - NS ED ROS FT
Constitutional: no fever, no chills  Head: NC, AT   Eyes: no redness, +decreased vision on L   ENMT: no nasal congestion/drainage, no sore throat   CV: no chest pain, no edema  Resp: no cough, no dyspnea  GI: no abdominal pain, no nausea, no vomiting, no diarrhea  : no dysuria, no hematuria   Skin: no lesions, no rashes   Neuro: no LOC, +headache, no sensory deficits, no weakness

## 2023-02-19 NOTE — ED PROVIDER NOTE - OBJECTIVE STATEMENT
68 year old female with hx of carotid artery aneurysm (5-6 mm in 2019), seizure disorder (off AEDs), and allergies who presents with headaches. Patient states that 68 year old female with hx of carotid artery aneurysm (5-6 mm in 2019), seizure disorder (off AEDs), and allergies who presents with headaches. Patient states that in 2019 she had a seizure and on imaging at [facility] done as an outpatient she was noted to have a carotid artery aneurysm 5-6mm in size. Subsequently was seen by neurosurgeon Dr. Bowie and recommended to have annual imaging to follow on this. Pt has not had repeat imaging. Yesterday however she was at the ophthalmologist and noted to have a decrease in visual acuity from 20/40 -> 20/250 of her left eye. The opthalmologist did not prescribe her contacts to emphasize the need for her to f/u with neurology/nsgy. In addition patient has had right sided headaches for a few months-years. She takes excedrin for these which sometimes helps. Has a right sided headache which feels "dull." 68 year old female with hx of carotid artery aneurysm (5-6 mm in 2019), seizure disorder (off AEDs), and allergies who presents with headaches. Patient states that in 2019 she had a seizure and on imaging at [facility] done as an outpatient she was noted to have a carotid artery aneurysm 5-6mm in size. Subsequently was seen by neurosurgeon Dr. Bowie and recommended to have annual imaging to follow on this. Pt has not had repeat imaging. Yesterday however she was at the optometrist and noted to have a decrease in visual acuity from 20/40 -> 20/250 of her left eye. The optometrist did not prescribe her contacts to emphasize the need for her to f/u with neurology/nsgy. In addition patient has had right sided headaches for a few months-years. She takes excedrin for these which sometimes helps. Has a right sided headache which feels "dull." Not any different from her typical headaches.

## 2023-02-20 DIAGNOSIS — I67.1 CEREBRAL ANEURYSM, NONRUPTURED: ICD-10-CM

## 2023-02-20 LAB
ANION GAP SERPL CALC-SCNC: 12 MMOL/L — SIGNIFICANT CHANGE UP (ref 5–17)
APTT BLD: 36 SEC — HIGH (ref 27.5–35.5)
BLD GP AB SCN SERPL QL: SIGNIFICANT CHANGE UP
BUN SERPL-MCNC: 16.1 MG/DL — SIGNIFICANT CHANGE UP (ref 8–20)
CALCIUM SERPL-MCNC: 9.3 MG/DL — SIGNIFICANT CHANGE UP (ref 8.4–10.5)
CHLORIDE SERPL-SCNC: 104 MMOL/L — SIGNIFICANT CHANGE UP (ref 96–108)
CK SERPL-CCNC: 81 U/L — SIGNIFICANT CHANGE UP (ref 25–170)
CO2 SERPL-SCNC: 26 MMOL/L — SIGNIFICANT CHANGE UP (ref 22–29)
CREAT SERPL-MCNC: 0.72 MG/DL — SIGNIFICANT CHANGE UP (ref 0.5–1.3)
EGFR: 92 ML/MIN/1.73M2 — SIGNIFICANT CHANGE UP
FLUAV AG NPH QL: SIGNIFICANT CHANGE UP
FLUBV AG NPH QL: SIGNIFICANT CHANGE UP
GLUCOSE SERPL-MCNC: 102 MG/DL — HIGH (ref 70–99)
HCV AB S/CO SERPL IA: 0.1 S/CO — SIGNIFICANT CHANGE UP (ref 0–0.99)
HCV AB SERPL-IMP: SIGNIFICANT CHANGE UP
INR BLD: 1.09 RATIO — SIGNIFICANT CHANGE UP (ref 0.88–1.16)
MAGNESIUM SERPL-MCNC: 2 MG/DL — SIGNIFICANT CHANGE UP (ref 1.6–2.6)
PHOSPHATE SERPL-MCNC: 3.3 MG/DL — SIGNIFICANT CHANGE UP (ref 2.4–4.7)
POTASSIUM SERPL-MCNC: 3.5 MMOL/L — SIGNIFICANT CHANGE UP (ref 3.5–5.3)
POTASSIUM SERPL-SCNC: 3.5 MMOL/L — SIGNIFICANT CHANGE UP (ref 3.5–5.3)
PROTHROM AB SERPL-ACNC: 12.7 SEC — SIGNIFICANT CHANGE UP (ref 10.5–13.4)
RSV RNA NPH QL NAA+NON-PROBE: SIGNIFICANT CHANGE UP
SARS-COV-2 RNA SPEC QL NAA+PROBE: SIGNIFICANT CHANGE UP
SODIUM SERPL-SCNC: 142 MMOL/L — SIGNIFICANT CHANGE UP (ref 135–145)
TROPONIN T SERPL-MCNC: <0.01 NG/ML — SIGNIFICANT CHANGE UP (ref 0–0.06)

## 2023-02-20 PROCEDURE — 99221 1ST HOSP IP/OBS SF/LOW 40: CPT

## 2023-02-20 PROCEDURE — 70544 MR ANGIOGRAPHY HEAD W/O DYE: CPT | Mod: 26,59

## 2023-02-20 PROCEDURE — 99232 SBSQ HOSP IP/OBS MODERATE 35: CPT

## 2023-02-20 PROCEDURE — 70553 MRI BRAIN STEM W/O & W/DYE: CPT | Mod: 26

## 2023-02-20 PROCEDURE — 93010 ELECTROCARDIOGRAM REPORT: CPT

## 2023-02-20 PROCEDURE — 99233 SBSQ HOSP IP/OBS HIGH 50: CPT

## 2023-02-20 PROCEDURE — 70548 MR ANGIOGRAPHY NECK W/DYE: CPT | Mod: 26

## 2023-02-20 RX ORDER — LEVETIRACETAM 250 MG/1
500 TABLET, FILM COATED ORAL EVERY 12 HOURS
Refills: 0 | Status: DISCONTINUED | OUTPATIENT
Start: 2023-02-20 | End: 2023-02-21

## 2023-02-20 RX ORDER — ACETAMINOPHEN 500 MG
650 TABLET ORAL EVERY 6 HOURS
Refills: 0 | Status: DISCONTINUED | OUTPATIENT
Start: 2023-02-20 | End: 2023-02-21

## 2023-02-20 RX ORDER — LABETALOL HCL 100 MG
10 TABLET ORAL
Refills: 0 | Status: DISCONTINUED | OUTPATIENT
Start: 2023-02-20 | End: 2023-02-21

## 2023-02-20 RX ORDER — SODIUM CHLORIDE 9 MG/ML
1000 INJECTION INTRAMUSCULAR; INTRAVENOUS; SUBCUTANEOUS
Refills: 0 | Status: DISCONTINUED | OUTPATIENT
Start: 2023-02-20 | End: 2023-02-21

## 2023-02-20 RX ORDER — ONDANSETRON 8 MG/1
4 TABLET, FILM COATED ORAL EVERY 6 HOURS
Refills: 0 | Status: DISCONTINUED | OUTPATIENT
Start: 2023-02-20 | End: 2023-02-21

## 2023-02-20 RX ORDER — HYDRALAZINE HCL 50 MG
10 TABLET ORAL
Refills: 0 | Status: DISCONTINUED | OUTPATIENT
Start: 2023-02-20 | End: 2023-02-21

## 2023-02-20 RX ADMIN — Medication 650 MILLIGRAM(S): at 05:24

## 2023-02-20 RX ADMIN — LEVETIRACETAM 500 MILLIGRAM(S): 250 TABLET, FILM COATED ORAL at 18:31

## 2023-02-20 RX ADMIN — LEVETIRACETAM 500 MILLIGRAM(S): 250 TABLET, FILM COATED ORAL at 04:55

## 2023-02-20 RX ADMIN — ONDANSETRON 4 MILLIGRAM(S): 8 TABLET, FILM COATED ORAL at 04:55

## 2023-02-20 RX ADMIN — Medication 650 MILLIGRAM(S): at 04:54

## 2023-02-20 RX ADMIN — SODIUM CHLORIDE 75 MILLILITER(S): 9 INJECTION INTRAMUSCULAR; INTRAVENOUS; SUBCUTANEOUS at 18:32

## 2023-02-20 RX ADMIN — Medication 650 MILLIGRAM(S): at 15:43

## 2023-02-20 RX ADMIN — Medication 650 MILLIGRAM(S): at 16:42

## 2023-02-20 NOTE — H&P ADULT - NSHPREVIEWOFSYSTEMS_GEN_ALL_CORE
Constitutional:  no fevers, chills, or new weakness  Eyes: + decreased vision in left eye, conjunctiva normal, no nystagmus, no double vision   Neurologic: + intermittent right sided headaches, No new weakness, no seizure reported  Ears, nose, mouth throat:  No ottorrhea. No change in hearing, No anosmia, No oral lesions, No sore throat  Cardiovascular: No palpitations, no chest pain, no nocturnal or positional dyspnea.  Respiratory: No shortness of breath, No Cough  Gastrointestinal: No change in bowel habits, no change in appetite  Genitourinary: No Frequency, No Dysuria, no Hematuria  Musculoskeletal: No muscle wasting, No new weakness  Psych: No changes in mood, Denies drug use, Denies history of psyciatric illness  Integumentary: Denies new skin lesions  Endocrine: Denies history of DM, denies history of thyroid disease, No heat or cold intolerance  Heme/Lymph: No use of antiplatelet/anticoagulant  Allergic / Immune: No active allergies unless otherwise specified in medical chart    All other systems reviewed and are negative

## 2023-02-20 NOTE — H&P ADULT - NSHPPHYSICALEXAM_GEN_ALL_CORE
Physical Exam:     Constitutional: NAD  	Neuro  	* Mental Status:  GCS 15: E4, V5, M6. Awake, alert, oriented to conversation. no  expressive aphasia & word finding difficulty. Able to name objects & their function.   	* Cranial Nerves: Cnii-Cnxii grossly intact. PERRL, EOMI, tongue midline, no gaze deviation. No field cut  	* Motor: RUE 5/5, LUE 5/5, RLE 5/5, LLE 5/5, no drift or dysmetria  	* Sensory: Sensation intact to light touch  	* Reflexes: Not assessed  	* Gait: Not assessed    	Cardiovascular:  Regular rate and rhythm.  	Eyes: See neurologic examination with detailed examination of eyes.  	ENT: No JVD, Trachea Midline.  	Respiratory: non labored   	Gastrointestinal: Soft, nontender, nondistended.  	Genitourinary: Female  	Musculoskeletal: No muscle wasting noted, (See neuorlogic assessment for full muscle strength assessment) No pretibial edema appreciated, no appreciable calf tenderness.  	Skin:  no skin breakdown  	Musculoskeletal: See detailed muscle strength examination, listed under neurologic examination.  Hematologic / Lymph / Immunologic: No bleeding from IV sites or wounds, No lymphadenopathy, No HIves or allergic type skin lesions

## 2023-02-20 NOTE — PATIENT PROFILE ADULT - IS THERE A SUSPICION OF ABUSE/NEGLIGENCE?
Continue hydrocodone as needed for pain relief.  Written prescription referral given for her wheelchair to be repaired.   no

## 2023-02-20 NOTE — H&P ADULT - NSHPLABSRESULTS_GEN_ALL_CORE
< from: CT Angio Head w/ IV Cont (02.19.23 @ 21:23) >    FINDINGS:    CT head:  No acute intracranial hemorrhage, mass effect or midline shift.  No CT evidence of acute large vascular territory infarct.  The ventricles and cortical sulci are within normal limits.  Scattered hypodensities in the periventricular white matter are   nonspecific, but likely sequela of small vessel ischemic disease.    The paranasal sinuses and mastoid air cells are well aerated.    CTA head and neck:  Conventional arch anatomy. Great vessel origins are patent. Innominate   and visualized subclavian arteries are patent. The common carotid   arteries are unremarkable.    The carotid bifurcations are unremarkable. The internal carotid arteries   are patent without significant stenosis. There is a 5 x 5 x 5 mm   posteriorly orientated aneurysm arising off of the distal cavernous left   internal carotid artery, stable compared to prior MRA head dated  11/27/2018.    The anterior and middle cerebral arteries are patent.    Bilateral posterior communicating arteries are seen.    Bilateral vertebral arteries are unremarkable from their origins to their   intracranial segments.    < end of copied text >

## 2023-02-20 NOTE — H&P ADULT - HISTORY OF PRESENT ILLNESS
68F with known L ICA cavernous aneurysm diagnosed after patient had seizures. Pt was following as outpatient, told she should continue with surveillance. Her last imaging was in 2018. However, patient has noticed worsening vision in her left eye for the past few weeks, she went to an optometrist today  who said her vision has worsened from 20/40 to 20/250 and recommended she follow up with a neurosurgeon or neurologist. Patient came to ED, CTA head performed that showed L ICA cavernous aneurysm that is the same size when compared to 2018 imaging. Patient reports frequent right sided headaches for a few months, recent vision change, no nausea, vomiting.

## 2023-02-20 NOTE — CONSULT NOTE ADULT - ATTENDING COMMENTS
Left cavernous ICA aneurysm in patient with progressive left eye vision loss. Vision loss cannot be explained by the aneurysm. Since it is in the cavernous ICA, it is extremely low risk for rupture, and rupture does not cause SAH. Recommend no intervention at this time and likely will never require intervention. f/u in 2 years with new MRA.

## 2023-02-20 NOTE — H&P ADULT - NS ATTEND AMEND GEN_ALL_CORE FT
KEON Attg:    see above    patient seen and examined     agree with exam as above    imaging reviewed    agree with plan as above  case also d/w Dr. Paul

## 2023-02-20 NOTE — CONSULT NOTE ADULT - ASSESSMENT
Assessment:   68F with PMH known L ICA aneurysm who presented with L eye vision loss. Patient presented to ED for workup, CTA showed stable L ICA aneurysm. Neuro IR consulted.       Plan:  - Discussed with Dr. Parada  - No need for acute neurointerventional procedure at this time  - Vision loss very unlikely to be related to stable L ICA aneurysm  - MRI / MRA pending  - Recommend urgent ophthalmology evaluation to further characterize vision loss  - Normotensive  - Q4 hour neuro checks  - Pending workup, possible diagnostic angiogram   - Further care per primary team

## 2023-02-20 NOTE — PATIENT PROFILE ADULT - FALL HARM RISK - HARM RISK INTERVENTIONS
Communicate Risk of Fall with Harm to all staff/Monitor for mental status changes/Monitor gait and stability/Reinforce activity limits and safety measures with patient and family/Tailored Fall Risk Interventions/Visual Cue: Yellow wristband and red socks/Bed in lowest position, wheels locked, appropriate side rails in place/Call bell, personal items and telephone in reach/Instruct patient to call for assistance before getting out of bed or chair/Non-slip footwear when patient is out of bed/[place] to call system/Physically safe environment - no spills, clutter or unnecessary equipment/Purposeful Proactive Rounding/Room/bathroom lighting operational, light cord in reach Communicate Risk of Fall with Harm to all staff/Reinforce activity limits and safety measures with patient and family/Tailored Fall Risk Interventions/Visual Cue: Yellow wristband and red socks/Bed in lowest position, wheels locked, appropriate side rails in place/Call bell, personal items and telephone in reach/Instruct patient to call for assistance before getting out of bed or chair/Non-slip footwear when patient is out of bed/[place] to call system/Physically safe environment - no spills, clutter or unnecessary equipment/Purposeful Proactive Rounding/Room/bathroom lighting operational, light cord in reach

## 2023-02-20 NOTE — CONSULT NOTE ADULT - SUBJECTIVE AND OBJECTIVE BOX
Patient is a 68y old  Female who presents with a chief complaint of vision loss   HPI:  68F with known L ICA cavernous aneurysm from 2018, has not followed up since that time, who presented to ED with L eye vision loss. Patient reports that she has noticed her L eye vision worsening over the past 1 month which prompted her visit an optometrist. Patient was found to have significantly worse vision, reportedly 20/40 last year and now 20/250 in the L eye. Patient was recommended to follow up for her aneurysm, patient made outpatient appointments with both neurosurgery and ophthalmology. However, patient was concerned about her vision loss which prompted her visit to the ED. Patient had CTH/CTA which showed no intracranial hemorrhage and stable L ICA aneurysm. Neurosurgery consulted for findings who then consulted neuro IR for recommendations. Patient denies other symptoms including headaches, weakness, numbness, tingling, CP, SOB, N/V.       PAST MEDICAL & SURGICAL HISTORY:  No pertinent past medical history  Seizure disorder  History of cholecystectomy      Allergies  latex (Unknown)  No Known Drug Allergies  shellfish (Unknown)      REVIEW OF SYSTEMS  Negative except as noted in HPI  CONSTITUTIONAL: No fever, weight loss, or fatigue  EYES: No eye pain, visual disturbances, or discharge  ENMT: + L eye vision loss. No difficulty hearing, tinnitus, vertigo; No sinus or throat pain  NECK: No pain or stiffness  RESPIRATORY: No cough, wheezing, chills or hemoptysis; No shortness of breath  CARDIOVASCULAR: No chest pain, palpitations, dizziness, or leg swelling  GASTROINTESTINAL: No abdominal or epigastric pain. No nausea, vomiting, or hematemesis; No diarrhea or constipation. No melena or hematochezia.  GENITOURINARY: No dysuria, frequency, hematuria, or incontinence  NEUROLOGICAL: No headaches, memory loss, loss of strength, numbness, or tremors  MUSCULOSKELETAL: No joint pain or swelling; No muscle, back, or extremity pain      HOME MEDICATIONS:  Home Medications:  ZyrTEC:  (19 Nov 2018 12:50)      MEDICATIONS:  Antibiotics:    Neuro:  acetaminophen     Tablet .. 650 milliGRAM(s) Oral every 6 hours PRN  levETIRAcetam 500 milliGRAM(s) Oral every 12 hours  ondansetron Injectable 4 milliGRAM(s) IV Push every 6 hours PRN    Anticoagulation:    OTHER:  hydrALAZINE Injectable 10 milliGRAM(s) IV Push every 2 hours PRN  labetalol Injectable 10 milliGRAM(s) IV Push every 2 hours PRN    IVF:  sodium chloride 0.9%. 1000 milliLiter(s) IV Continuous <Continuous>      Vital Signs Last 24 Hrs  T(C): 36.8 (20 Feb 2023 06:15), Max: 37.1 (19 Feb 2023 17:32)  T(F): 98.2 (20 Feb 2023 06:15), Max: 98.8 (19 Feb 2023 20:31)  HR: 71 (20 Feb 2023 06:15) (71 - 81)  BP: 114/68 (20 Feb 2023 06:15) (114/68 - 142/78)  RR: 18 (20 Feb 2023 06:15) (16 - 18)  SpO2: 98% (20 Feb 2023 06:15) (98% - 100%)    Parameters below as of 20 Feb 2023 06:15  Patient On (Oxygen Delivery Method): room air      Physical Exam:  Constitutional: NAD, lying in bed  Neuro  * Mental Status:  GCS 15: Awake, alert, oriented to conversation. No aphasia or difficulty speaking. No dysarthria.  * Cranial Nerves: PERRL, EOMI, tongue midline, no gaze deviation. L eye blurred vision.   * Motor: RUE 5/5, LUE 5/5, RLE 5/5, LLE 5/5, no drift or dysmetria  * Sensory: Sensation intact to light touch  * Reflexes: not assessed       LABS:                        11.7   7.31  )-----------( 270      ( 19 Feb 2023 18:30 )             34.9     02-20    142  |  104  |  16.1  ----------------------------<  102<H>  3.5   |  26.0  |  0.72    Ca    9.3      20 Feb 2023 02:06  Phos  3.3     02-20  Mg     2.0     02-20    TPro  6.5<L>  /  Alb  3.8  /  TBili  <0.2<L>  /  DBili  x   /  AST  22  /  ALT  17  /  AlkPhos  78  02-19    PT/INR - ( 20 Feb 2023 02:06 )   PT: 12.7 sec;   INR: 1.09 ratio    PTT - ( 20 Feb 2023 02:06 )  PTT:36.0 sec      RADIOLOGY & ADDITIONAL STUDIES:  < from: CT Angio Head w/ IV Cont (02.19.23 @ 21:23) >  IMPRESSION:  CTA head and neck:  -5 mm left cavernous ICA aneurysm, stable compared to prior MRA head   dated 11/20/2017.  -Patent cervical and intracranial major arterial vasculature without   evidence of occlusion, hemodynamically significant stenosis, or   dissection.    Noncontrast CT head: No acute intracranial hemorrhage or CT evidence of   acute territorial infarct.    --- End of Report ---    PADMINI DUONG MD; Attending Radiologist  This document has been electronically signed. Feb 19 2023 10:39PM    < end of copied text >

## 2023-02-20 NOTE — PATIENT PROFILE ADULT - VISION (WITH CORRECTIVE LENSES IF THE PATIENT USUALLY WEARS THEM):
left eye blurry vision/Partially impaired: cannot see medication labels or newsprint, but can see obstacles in path, and the surrounding layout; can count fingers at arm's length

## 2023-02-20 NOTE — H&P ADULT - ASSESSMENT
68F with known L cavernous ICA aneurysm with new left vision deficit     Plan   - Admit to stepdown q2 neuro checks   - Keppra 500 BID   - MRI brain with and without contrast, MRA brain non con, MRA neck with contrast   - -140   - NPO   - SCDs only for chemo dvt ppx   - Type and screen, covid ordered   - IR consult for formal angiogram  X Size Of Lesion In Cm (Optional): 0 Size Of Lesion In Cm (Optional): 0.8 Introduction Text (Please End With A Colon): The following procedure was deferred: Detail Level: Detailed Procedure To Be Performed At Next Visit: Excision Instructions (Optional): Pt was advised that as long as the cyst isn’t inflamed then we can take it out

## 2023-02-21 ENCOUNTER — TRANSCRIPTION ENCOUNTER (OUTPATIENT)
Age: 68
End: 2023-02-21

## 2023-02-21 ENCOUNTER — APPOINTMENT (OUTPATIENT)
Dept: NEUROSURGERY | Facility: CLINIC | Age: 68
End: 2023-02-21

## 2023-02-21 VITALS
OXYGEN SATURATION: 100 % | DIASTOLIC BLOOD PRESSURE: 70 MMHG | HEART RATE: 74 BPM | RESPIRATION RATE: 17 BRPM | SYSTOLIC BLOOD PRESSURE: 113 MMHG

## 2023-02-21 PROCEDURE — 99285 EMERGENCY DEPT VISIT HI MDM: CPT

## 2023-02-21 PROCEDURE — 93005 ELECTROCARDIOGRAM TRACING: CPT

## 2023-02-21 PROCEDURE — 80053 COMPREHEN METABOLIC PANEL: CPT

## 2023-02-21 PROCEDURE — 85730 THROMBOPLASTIN TIME PARTIAL: CPT

## 2023-02-21 PROCEDURE — 70496 CT ANGIOGRAPHY HEAD: CPT | Mod: MG

## 2023-02-21 PROCEDURE — 84100 ASSAY OF PHOSPHORUS: CPT

## 2023-02-21 PROCEDURE — 86900 BLOOD TYPING SEROLOGIC ABO: CPT

## 2023-02-21 PROCEDURE — 96374 THER/PROPH/DIAG INJ IV PUSH: CPT

## 2023-02-21 PROCEDURE — 70544 MR ANGIOGRAPHY HEAD W/O DYE: CPT

## 2023-02-21 PROCEDURE — 87637 SARSCOV2&INF A&B&RSV AMP PRB: CPT

## 2023-02-21 PROCEDURE — 70498 CT ANGIOGRAPHY NECK: CPT | Mod: MG

## 2023-02-21 PROCEDURE — 85652 RBC SED RATE AUTOMATED: CPT

## 2023-02-21 PROCEDURE — 86803 HEPATITIS C AB TEST: CPT

## 2023-02-21 PROCEDURE — 86901 BLOOD TYPING SEROLOGIC RH(D): CPT

## 2023-02-21 PROCEDURE — 36415 COLL VENOUS BLD VENIPUNCTURE: CPT

## 2023-02-21 PROCEDURE — 85610 PROTHROMBIN TIME: CPT

## 2023-02-21 PROCEDURE — 85025 COMPLETE CBC W/AUTO DIFF WBC: CPT

## 2023-02-21 PROCEDURE — 82550 ASSAY OF CK (CPK): CPT

## 2023-02-21 PROCEDURE — 84484 ASSAY OF TROPONIN QUANT: CPT

## 2023-02-21 PROCEDURE — 83735 ASSAY OF MAGNESIUM: CPT

## 2023-02-21 PROCEDURE — 86140 C-REACTIVE PROTEIN: CPT

## 2023-02-21 PROCEDURE — 70548 MR ANGIOGRAPHY NECK W/DYE: CPT

## 2023-02-21 PROCEDURE — 70553 MRI BRAIN STEM W/O & W/DYE: CPT

## 2023-02-21 PROCEDURE — 70450 CT HEAD/BRAIN W/O DYE: CPT | Mod: MG

## 2023-02-21 PROCEDURE — G1004: CPT

## 2023-02-21 PROCEDURE — 86850 RBC ANTIBODY SCREEN: CPT

## 2023-02-21 PROCEDURE — 80048 BASIC METABOLIC PNL TOTAL CA: CPT

## 2023-02-21 RX ORDER — ACETAMINOPHEN 500 MG
2 TABLET ORAL
Qty: 0 | Refills: 0 | DISCHARGE
Start: 2023-02-21

## 2023-02-21 RX ADMIN — SODIUM CHLORIDE 75 MILLILITER(S): 9 INJECTION INTRAMUSCULAR; INTRAVENOUS; SUBCUTANEOUS at 06:52

## 2023-02-21 RX ADMIN — LEVETIRACETAM 500 MILLIGRAM(S): 250 TABLET, FILM COATED ORAL at 05:58

## 2023-02-21 NOTE — DISCHARGE NOTE PROVIDER - HOSPITAL COURSE
68F with known L ICA cavernous aneurysm diagnosed in 2018 after patient had seizures. Pt was following as outpatient with Dr Bowie, told she should continue with surveillance but did not follow after 2019. Patient has noticed worsening vision in her left eye for the past few weeks, she went to an optometrist who said her vision has worsened from 20/40 to 20/250 and recommended she follow up with a neurosurgeon or neurologist. Patient came to ED, CTA head performed that showed L ICA cavernous aneurysm that is the same size when compared to 2018 imaging. Patient reports frequent right sided headaches for a few months, recent vision change. Neurovascular consulted at this time and there is no acute role for any cerebral angiogram.

## 2023-02-21 NOTE — DISCHARGE NOTE NURSING/CASE MANAGEMENT/SOCIAL WORK - NSDCPEFALRISK_GEN_ALL_CORE
For information on Fall & Injury Prevention, visit: [email or web address] OR  [email or web address] OR  https://www.cdc.gov/steadi/patient.html

## 2023-02-21 NOTE — DISCHARGE NOTE PROVIDER - PROVIDER TOKENS
PROVIDER:[TOKEN:[[id number]],FOLLOWUP:[1 month]],PROVIDER:[TOKEN:[[id number]],FOLLOWUP:[1 week]],PROVIDER:[TOKEN:[[id number]],FOLLOWUP:[1 week]]

## 2023-02-21 NOTE — DISCHARGE NOTE PROVIDER - NSDCCPCAREPLAN_GEN_ALL_CORE_FT
PRINCIPAL DISCHARGE DIAGNOSIS  Diagnosis: Cerebral aneurysm  Assessment and Plan of Treatment: Stable appearing aneurysm, will need outpatient follow up with Dr Rhodes      SECONDARY DISCHARGE DIAGNOSES  Diagnosis: Vision changes  Assessment and Plan of Treatment: Blurred vision of the LEFT eye only, will need outpatient opthomology evaluation

## 2023-02-21 NOTE — DISCHARGE NOTE NURSING/CASE MANAGEMENT/SOCIAL WORK - PATIENT PORTAL LINK FT
You can access the FollowMyHealth Patient Portal offered by [facility] by registering at the following website: [email or web address]. By joining [facility]’s FollowMyHealth portal, you will also be able to view your health information using other applications (apps) compatible with our system.

## 2023-02-21 NOTE — DISCHARGE NOTE PROVIDER - NSDCCPTREATMENT_GEN_ALL_CORE_FT
PRINCIPAL PROCEDURE  Procedure: MRA of brain  Findings and Treatment:       SECONDARY PROCEDURE  Procedure: CTA head w/w/o contrast  Findings and Treatment:

## 2023-02-21 NOTE — DISCHARGE NOTE PROVIDER - CARE PROVIDER_API CALL
Ranjit Rhodes)  Neurosurgery  [address] [facility], [address] [place] [address]  Phone: [phone]  Fax: [phone]  Follow Up Time: 1 month    David Garber)  Ophthalmology  [address] [place] [address] [address]  [place], [place] [id number]  Phone: [phone]  Fax: [phone]  Follow Up Time: 1 week    Cuco Murray)  Ophthalmology  [address] [place] [address]  Phone: [phone]  Fax: [phone]  Follow Up Time: 1 week

## 2023-02-21 NOTE — DISCHARGE NOTE PROVIDER - NSDCMRMEDTOKEN_GEN_ALL_CORE_FT
Keppra 500 mg oral tablet: 1 tab(s) orally 2 times a day   ZyrTEC:    acetaminophen 325 mg oral tablet: 2 tab(s) orally every 6 hours, As needed, Mild Pain (1 - 3), Moderate Pain (4 - 6), Severe Pain (7 - 10)  Keppra 500 mg oral tablet: 1 tab(s) orally 2 times a day   ZyrTEC:

## 2023-02-21 NOTE — DISCHARGE NOTE PROVIDER - CARE PROVIDERS DIRECT ADDRESSES
[email or web address]

## 2023-03-01 ENCOUNTER — OFFICE (OUTPATIENT)
Dept: URBAN - METROPOLITAN AREA CLINIC 113 | Facility: CLINIC | Age: 68
Setting detail: OPHTHALMOLOGY
End: 2023-03-01
Payer: COMMERCIAL

## 2023-03-01 DIAGNOSIS — H43.393: ICD-10-CM

## 2023-03-01 DIAGNOSIS — H25.12: ICD-10-CM

## 2023-03-01 DIAGNOSIS — H04.123: ICD-10-CM

## 2023-03-01 DIAGNOSIS — H25.13: ICD-10-CM

## 2023-03-01 DIAGNOSIS — H57.89: ICD-10-CM

## 2023-03-01 DIAGNOSIS — H35.342: ICD-10-CM

## 2023-03-01 PROCEDURE — 99214 OFFICE O/P EST MOD 30 MIN: CPT | Performed by: OPHTHALMOLOGY

## 2023-03-01 PROCEDURE — 92136 OPHTHALMIC BIOMETRY: CPT | Performed by: OPHTHALMOLOGY

## 2023-03-01 PROCEDURE — 92250 FUNDUS PHOTOGRAPHY W/I&R: CPT | Performed by: OPHTHALMOLOGY

## 2023-03-01 ASSESSMENT — REFRACTION_MANIFEST
OD_SPHERE: +1.50
OS_CYLINDER: -0.25
OD_VA1: 20/20
OD_CYLINDER: -0.50
OS_VA1: 20/80
OS_AXIS: 095
OD_AXIS: 085
OS_SPHERE: +1.50

## 2023-03-01 ASSESSMENT — KERATOMETRY
OS_K1K2_AVERAGE: 42.375
OS_K1POWER_DIOPTERS: 42.25
OS_AXISANGLE_DEGREES: 24
OS_CYLAXISANGLE_DEGREES: 114
OD_AXISANGLE_DEGREES: 108
OD_K2POWER_DIOPTERS: 43.00
OS_K2POWER_DIOPTERS: 42.50
OD_K1POWER_DIOPTERS: 42.25
OS_AXISANGLE2_DEGREES: 114
OS_K2POWER_DIOPTERS: 42.50
OD_AXISANGLE_DEGREES: 18
OD_CYLPOWER_DEGREES: 0.75
OS_CYLPOWER_DEGREES: 0.25
OD_CYLAXISANGLE_DEGREES: 108
OD_K1K2_AVERAGE: 42.625
OD_K1POWER_DIOPTERS: 42.25
OD_K2POWER_DIOPTERS: 43.00
OS_AXISANGLE_DEGREES: 114
OD_AXISANGLE2_DEGREES: 108
OS_K1POWER_DIOPTERS: 42.25

## 2023-03-01 ASSESSMENT — REFRACTION_AUTOREFRACTION
OD_SPHERE: +1.50
OS_SPHERE: +1.50
OS_CYLINDER: -0.25
OD_CYLINDER: -0.50
OD_AXIS: 086
OS_AXIS: 097

## 2023-03-01 ASSESSMENT — TONOMETRY
OD_IOP_MMHG: 18
OS_IOP_MMHG: 17

## 2023-03-01 ASSESSMENT — SUPERFICIAL PUNCTATE KERATITIS (SPK)
OD_SPK: 1+ 2+
OS_SPK: T

## 2023-03-01 ASSESSMENT — SPHEQUIV_DERIVED
OD_SPHEQUIV: 1.25
OS_SPHEQUIV: 1.375
OS_SPHEQUIV: 1.375
OD_SPHEQUIV: 1.25

## 2023-03-01 ASSESSMENT — AXIALLENGTH_DERIVED
OD_AL: 23.4274
OS_AL: 23.4701
OD_AL: 23.4274
OS_AL: 23.4701

## 2023-03-01 ASSESSMENT — VISUAL ACUITY
OS_BCVA: 20/20
OD_BCVA: 20/80

## 2023-03-01 ASSESSMENT — CONFRONTATIONAL VISUAL FIELD TEST (CVF)
OS_FINDINGS: FULL
OD_FINDINGS: FULL

## 2023-03-06 ENCOUNTER — OFFICE (OUTPATIENT)
Dept: URBAN - METROPOLITAN AREA CLINIC 94 | Facility: CLINIC | Age: 68
Setting detail: OPHTHALMOLOGY
End: 2023-03-06
Payer: COMMERCIAL

## 2023-03-06 DIAGNOSIS — Z01.812: ICD-10-CM

## 2023-03-06 DIAGNOSIS — Z20.822: ICD-10-CM

## 2023-03-06 PROCEDURE — 99211 OFF/OP EST MAY X REQ PHY/QHP: CPT | Performed by: OPHTHALMOLOGY

## 2023-03-08 ENCOUNTER — ASC (OUTPATIENT)
Dept: URBAN - METROPOLITAN AREA SURGERY 8 | Facility: SURGERY | Age: 68
Setting detail: OPHTHALMOLOGY
End: 2023-03-08
Payer: COMMERCIAL

## 2023-03-08 DIAGNOSIS — H57.03: ICD-10-CM

## 2023-03-08 DIAGNOSIS — H25.12: ICD-10-CM

## 2023-03-08 DIAGNOSIS — H52.212: ICD-10-CM

## 2023-03-08 PROCEDURE — 66982 XCAPSL CTRC RMVL CPLX WO ECP: CPT | Performed by: OPHTHALMOLOGY

## 2023-03-08 PROCEDURE — S9986 NOT MEDICALLY NECESSARY SVC: HCPCS | Performed by: OPHTHALMOLOGY

## 2023-03-08 PROCEDURE — FEMTO WITH CATARACT LASER: Performed by: OPHTHALMOLOGY

## 2023-03-08 ASSESSMENT — REFRACTION_MANIFEST
OD_AXIS: 085
OS_VA1: 20/80
OS_CYLINDER: -0.25
OS_AXIS: 095
OS_SPHERE: +1.50
OD_VA1: 20/20
OD_CYLINDER: -0.50
OD_SPHERE: +1.50

## 2023-03-08 ASSESSMENT — REFRACTION_AUTOREFRACTION
OS_AXIS: 097
OD_CYLINDER: -0.50
OS_CYLINDER: -0.25
OD_SPHERE: +1.50
OD_AXIS: 086
OS_SPHERE: +1.50

## 2023-03-08 ASSESSMENT — KERATOMETRY
OS_K2POWER_DIOPTERS: 42.50
OD_K1POWER_DIOPTERS: 42.25
OS_K1POWER_DIOPTERS: 42.25
OD_AXISANGLE_DEGREES: 108
OS_AXISANGLE_DEGREES: 114
OD_K2POWER_DIOPTERS: 43.00

## 2023-03-08 ASSESSMENT — AXIALLENGTH_DERIVED
OS_AL: 23.4701
OS_AL: 23.4701
OD_AL: 23.4274
OD_AL: 23.4274

## 2023-03-08 ASSESSMENT — SPHEQUIV_DERIVED
OD_SPHEQUIV: 1.25
OS_SPHEQUIV: 1.375
OD_SPHEQUIV: 1.25
OS_SPHEQUIV: 1.375

## 2023-03-08 ASSESSMENT — SUPERFICIAL PUNCTATE KERATITIS (SPK)
OD_SPK: 1+ 2+
OS_SPK: T

## 2023-03-08 ASSESSMENT — VISUAL ACUITY
OD_BCVA: 20/80
OS_BCVA: 20/20

## 2023-03-09 ENCOUNTER — RX ONLY (RX ONLY)
Age: 68
End: 2023-03-09

## 2023-03-09 ENCOUNTER — OFFICE (OUTPATIENT)
Dept: URBAN - METROPOLITAN AREA CLINIC 112 | Facility: CLINIC | Age: 68
Setting detail: OPHTHALMOLOGY
End: 2023-03-09
Payer: COMMERCIAL

## 2023-03-09 DIAGNOSIS — Z96.1: ICD-10-CM

## 2023-03-09 DIAGNOSIS — H35.342: ICD-10-CM

## 2023-03-09 PROCEDURE — 99024 POSTOP FOLLOW-UP VISIT: CPT | Performed by: PHYSICIAN ASSISTANT

## 2023-03-09 ASSESSMENT — REFRACTION_AUTOREFRACTION
OS_CYLINDER: -0.25
OD_AXIS: 086
OD_SPHERE: +1.50
OD_CYLINDER: -0.50
OS_SPHERE: -1.25
OS_AXIS: 135

## 2023-03-09 ASSESSMENT — KERATOMETRY
OS_AXISANGLE_DEGREES: 114
OD_K2POWER_DIOPTERS: 43.00
OS_K2POWER_DIOPTERS: 42.50
OD_AXISANGLE_DEGREES: 108
OS_K1POWER_DIOPTERS: 42.25
OD_K1POWER_DIOPTERS: 42.25

## 2023-03-09 ASSESSMENT — REFRACTION_MANIFEST
OD_AXIS: 085
OD_SPHERE: +1.50
OD_CYLINDER: -0.50
OS_AXIS: 095
OS_SPHERE: +1.50
OS_CYLINDER: -0.25
OS_VA1: 20/80
OD_VA1: 20/20

## 2023-03-09 ASSESSMENT — VISUAL ACUITY
OS_BCVA: 20/20
OD_BCVA: 20/300

## 2023-03-09 ASSESSMENT — CONFRONTATIONAL VISUAL FIELD TEST (CVF)
OS_FINDINGS: FULL
OD_FINDINGS: FULL

## 2023-03-09 ASSESSMENT — SPHEQUIV_DERIVED
OD_SPHEQUIV: 1.25
OS_SPHEQUIV: 1.375
OS_SPHEQUIV: -1.375
OD_SPHEQUIV: 1.25

## 2023-03-09 ASSESSMENT — AXIALLENGTH_DERIVED
OD_AL: 23.4274
OS_AL: 23.4701
OS_AL: 24.5806
OD_AL: 23.4274

## 2023-03-09 ASSESSMENT — SUPERFICIAL PUNCTATE KERATITIS (SPK)
OD_SPK: 1+ 2+
OS_SPK: T

## 2023-03-13 ENCOUNTER — OFFICE (OUTPATIENT)
Dept: URBAN - METROPOLITAN AREA CLINIC 105 | Facility: CLINIC | Age: 68
Setting detail: OPHTHALMOLOGY
End: 2023-03-13
Payer: COMMERCIAL

## 2023-03-13 DIAGNOSIS — H35.342: ICD-10-CM

## 2023-03-13 PROCEDURE — 92134 CPTRZ OPH DX IMG PST SGM RTA: CPT | Performed by: OPHTHALMOLOGY

## 2023-03-13 PROCEDURE — 92012 INTRM OPH EXAM EST PATIENT: CPT | Performed by: OPHTHALMOLOGY

## 2023-03-13 ASSESSMENT — SUPERFICIAL PUNCTATE KERATITIS (SPK)
OD_SPK: 1+ 2+
OS_SPK: T

## 2023-03-13 ASSESSMENT — VISUAL ACUITY
OS_BCVA: 20/25-1
OD_BCVA: 20/100

## 2023-03-13 ASSESSMENT — REFRACTION_MANIFEST
OS_VA1: 20/80
OD_VA1: 20/20
OS_CYLINDER: -0.25
OD_AXIS: 085
OD_SPHERE: +1.50
OS_AXIS: 095
OS_SPHERE: +1.50
OD_CYLINDER: -0.50

## 2023-03-13 ASSESSMENT — REFRACTION_AUTOREFRACTION
OD_SPHERE: +1.50
OD_AXIS: 086
OS_SPHERE: -1.25
OD_CYLINDER: -0.50
OS_AXIS: 135
OS_CYLINDER: -0.25

## 2023-03-13 ASSESSMENT — CONFRONTATIONAL VISUAL FIELD TEST (CVF)
OD_FINDINGS: FULL
OS_FINDINGS: FULL

## 2023-03-13 ASSESSMENT — KERATOMETRY
OS_K2POWER_DIOPTERS: 42.50
OS_AXISANGLE_DEGREES: 114
OD_K1POWER_DIOPTERS: 42.25
OD_AXISANGLE_DEGREES: 108
OS_K1POWER_DIOPTERS: 42.25
OD_K2POWER_DIOPTERS: 43.00

## 2023-03-13 ASSESSMENT — AXIALLENGTH_DERIVED
OD_AL: 23.4274
OS_AL: 23.4701
OD_AL: 23.4274
OS_AL: 24.5806

## 2023-03-13 ASSESSMENT — SPHEQUIV_DERIVED
OD_SPHEQUIV: 1.25
OS_SPHEQUIV: -1.375
OS_SPHEQUIV: 1.375
OD_SPHEQUIV: 1.25

## 2023-03-15 ENCOUNTER — OFFICE (OUTPATIENT)
Dept: URBAN - METROPOLITAN AREA CLINIC 94 | Facility: CLINIC | Age: 68
Setting detail: OPHTHALMOLOGY
End: 2023-03-15
Payer: COMMERCIAL

## 2023-03-15 DIAGNOSIS — Z96.1: ICD-10-CM

## 2023-03-15 PROCEDURE — 99024 POSTOP FOLLOW-UP VISIT: CPT | Performed by: PHYSICIAN ASSISTANT

## 2023-03-15 ASSESSMENT — KERATOMETRY
OD_K1POWER_DIOPTERS: 42.25
OD_AXISANGLE_DEGREES: 108
OS_K1POWER_DIOPTERS: 42.25
OS_AXISANGLE_DEGREES: 114
OD_K2POWER_DIOPTERS: 43.00
OS_K2POWER_DIOPTERS: 42.50

## 2023-03-15 ASSESSMENT — REFRACTION_AUTOREFRACTION
OD_SPHERE: +1.50
OD_CYLINDER: -0.50
OS_AXIS: 135
OS_SPHERE: -1.25
OD_AXIS: 086
OS_CYLINDER: -0.25

## 2023-03-15 ASSESSMENT — SPHEQUIV_DERIVED
OD_SPHEQUIV: 1.25
OS_SPHEQUIV: 1.375
OS_SPHEQUIV: -1.375
OD_SPHEQUIV: 1.25

## 2023-03-15 ASSESSMENT — REFRACTION_MANIFEST
OS_AXIS: 095
OD_SPHERE: +1.50
OD_AXIS: 085
OD_CYLINDER: -0.50
OS_VA1: 20/80
OD_VA1: 20/20
OS_CYLINDER: -0.25
OS_SPHERE: +1.50

## 2023-03-15 ASSESSMENT — VISUAL ACUITY
OD_BCVA: 20/100
OS_BCVA: 20/25

## 2023-03-15 ASSESSMENT — SUPERFICIAL PUNCTATE KERATITIS (SPK)
OD_SPK: 1+ 2+
OS_SPK: T

## 2023-03-15 ASSESSMENT — CONFRONTATIONAL VISUAL FIELD TEST (CVF)
OS_FINDINGS: FULL
OD_FINDINGS: FULL

## 2023-03-15 ASSESSMENT — TONOMETRY
OS_IOP_MMHG: 17
OD_IOP_MMHG: 17

## 2023-03-15 ASSESSMENT — AXIALLENGTH_DERIVED
OS_AL: 24.5806
OD_AL: 23.4274
OS_AL: 23.4701
OD_AL: 23.4274

## 2023-04-07 ENCOUNTER — ASC (OUTPATIENT)
Dept: URBAN - METROPOLITAN AREA SURGERY 8 | Facility: SURGERY | Age: 68
Setting detail: OPHTHALMOLOGY
End: 2023-04-07
Payer: COMMERCIAL

## 2023-04-07 DIAGNOSIS — H35.342: ICD-10-CM

## 2023-04-07 PROCEDURE — 67042 VIT FOR MACULAR HOLE: CPT | Performed by: OPHTHALMOLOGY

## 2023-04-08 ENCOUNTER — OFFICE (OUTPATIENT)
Dept: URBAN - METROPOLITAN AREA CLINIC 94 | Facility: CLINIC | Age: 68
Setting detail: OPHTHALMOLOGY
End: 2023-04-08
Payer: COMMERCIAL

## 2023-04-08 ENCOUNTER — RX ONLY (RX ONLY)
Age: 68
End: 2023-04-08

## 2023-04-08 DIAGNOSIS — H35.342: ICD-10-CM

## 2023-04-08 PROBLEM — H25.11 CATARACT SENILE NUCLEAR SCLEROSIS;  , RIGHT EYE: Status: ACTIVE | Noted: 2023-03-09

## 2023-04-08 PROBLEM — Z96.1 PSEUDOPHAKIA: Status: ACTIVE | Noted: 2023-03-09

## 2023-04-08 PROBLEM — H43.393 VITREOUS FLOATERS; BOTH EYES: Status: ACTIVE | Noted: 2023-03-01

## 2023-04-08 PROBLEM — H16.223 DRY EYE SYNDROME K SICCA; BOTH EYES: Status: ACTIVE | Noted: 2023-03-09

## 2023-04-08 PROBLEM — H57.89: Status: ACTIVE | Noted: 2023-03-01

## 2023-04-08 PROBLEM — H04.123 DRY EYE SYNDROME LACRIMAL GLAND; BOTH EYES: Status: ACTIVE | Noted: 2023-03-01

## 2023-04-08 PROCEDURE — 99024 POSTOP FOLLOW-UP VISIT: CPT | Performed by: OPHTHALMOLOGY

## 2023-04-08 ASSESSMENT — REFRACTION_AUTOREFRACTION
OD_CYLINDER: -0.50
OD_SPHERE: +1.50
OS_SPHERE: -1.25
OD_AXIS: 086
OS_AXIS: 135
OS_CYLINDER: -0.25

## 2023-04-08 ASSESSMENT — SUPERFICIAL PUNCTATE KERATITIS (SPK)
OD_SPK: 1+ 2+
OS_SPK: T

## 2023-04-08 ASSESSMENT — TONOMETRY
OD_IOP_MMHG: 17
OS_IOP_MMHG: 17

## 2023-04-08 ASSESSMENT — KERATOMETRY
OS_K1POWER_DIOPTERS: 42.25
OD_AXISANGLE_DEGREES: 108
OD_K2POWER_DIOPTERS: 43.00
OD_K1POWER_DIOPTERS: 42.25
OS_AXISANGLE_DEGREES: 114
OS_K2POWER_DIOPTERS: 42.50

## 2023-04-08 ASSESSMENT — VISUAL ACUITY
OD_BCVA: 20/100
OS_BCVA: 20/25

## 2023-04-08 ASSESSMENT — SPHEQUIV_DERIVED
OS_SPHEQUIV: -1.375
OD_SPHEQUIV: 1.25

## 2023-04-08 ASSESSMENT — AXIALLENGTH_DERIVED
OD_AL: 23.4274
OS_AL: 24.5806

## 2023-04-08 ASSESSMENT — CONFRONTATIONAL VISUAL FIELD TEST (CVF)
OS_FINDINGS: FULL
OD_FINDINGS: FULL

## 2023-04-15 ENCOUNTER — OFFICE (OUTPATIENT)
Dept: URBAN - METROPOLITAN AREA CLINIC 94 | Facility: CLINIC | Age: 68
Setting detail: OPHTHALMOLOGY
End: 2023-04-15
Payer: COMMERCIAL

## 2023-04-15 DIAGNOSIS — H35.342: ICD-10-CM

## 2023-04-15 PROCEDURE — 99024 POSTOP FOLLOW-UP VISIT: CPT | Performed by: OPHTHALMOLOGY

## 2023-04-15 ASSESSMENT — SPHEQUIV_DERIVED
OD_SPHEQUIV: 1.25
OS_SPHEQUIV: -1.375

## 2023-04-15 ASSESSMENT — TONOMETRY
OS_IOP_MMHG: 20
OD_IOP_MMHG: 21

## 2023-04-15 ASSESSMENT — REFRACTION_AUTOREFRACTION
OS_CYLINDER: -0.25
OS_SPHERE: -1.25
OD_AXIS: 086
OD_SPHERE: +1.50
OD_CYLINDER: -0.50
OS_AXIS: 135

## 2023-04-15 ASSESSMENT — AXIALLENGTH_DERIVED
OD_AL: 23.4274
OS_AL: 24.5806

## 2023-04-15 ASSESSMENT — KERATOMETRY
OS_K2POWER_DIOPTERS: 42.50
OS_AXISANGLE_DEGREES: 114
OD_AXISANGLE_DEGREES: 108
OD_K1POWER_DIOPTERS: 42.25
OD_K2POWER_DIOPTERS: 43.00
OS_K1POWER_DIOPTERS: 42.25

## 2023-04-15 ASSESSMENT — SUPERFICIAL PUNCTATE KERATITIS (SPK)
OS_SPK: T
OD_SPK: 1+ 2+

## 2023-04-15 ASSESSMENT — VISUAL ACUITY
OS_BCVA: 20/20-1
OD_BCVA: 20/100-1

## 2023-05-09 ENCOUNTER — OFFICE (OUTPATIENT)
Dept: URBAN - METROPOLITAN AREA CLINIC 94 | Facility: CLINIC | Age: 68
Setting detail: OPHTHALMOLOGY
End: 2023-05-09
Payer: COMMERCIAL

## 2023-05-09 DIAGNOSIS — H35.342: ICD-10-CM

## 2023-05-09 PROCEDURE — 99024 POSTOP FOLLOW-UP VISIT: CPT | Performed by: OPHTHALMOLOGY

## 2023-05-09 ASSESSMENT — TONOMETRY
OS_IOP_MMHG: 16
OD_IOP_MMHG: 15

## 2023-05-09 ASSESSMENT — REFRACTION_AUTOREFRACTION
OD_SPHERE: +1.50
OS_SPHERE: -1.25
OS_CYLINDER: -0.25
OD_CYLINDER: -0.50
OD_AXIS: 086
OS_AXIS: 135

## 2023-05-09 ASSESSMENT — KERATOMETRY
OD_K1POWER_DIOPTERS: 42.25
OS_AXISANGLE_DEGREES: 114
OS_K1POWER_DIOPTERS: 42.25
OD_K2POWER_DIOPTERS: 43.00
OS_K2POWER_DIOPTERS: 42.50
OD_AXISANGLE_DEGREES: 108

## 2023-05-09 ASSESSMENT — AXIALLENGTH_DERIVED
OS_AL: 24.5806
OD_AL: 23.4274

## 2023-05-09 ASSESSMENT — VISUAL ACUITY
OD_BCVA: 20/50-1
OS_BCVA: 20/20

## 2023-05-09 ASSESSMENT — CONFRONTATIONAL VISUAL FIELD TEST (CVF)
OS_FINDINGS: FULL
OD_FINDINGS: FULL

## 2023-05-09 ASSESSMENT — SUPERFICIAL PUNCTATE KERATITIS (SPK)
OS_SPK: T
OD_SPK: 1+ 2+

## 2023-05-09 ASSESSMENT — SPHEQUIV_DERIVED
OD_SPHEQUIV: 1.25
OS_SPHEQUIV: -1.375

## 2023-08-15 ENCOUNTER — OFFICE (OUTPATIENT)
Dept: URBAN - METROPOLITAN AREA CLINIC 94 | Facility: CLINIC | Age: 68
Setting detail: OPHTHALMOLOGY
End: 2023-08-15
Payer: COMMERCIAL

## 2023-08-15 ENCOUNTER — RX ONLY (RX ONLY)
Age: 68
End: 2023-08-15

## 2023-08-15 DIAGNOSIS — H35.342: ICD-10-CM

## 2023-08-15 PROCEDURE — 92134 CPTRZ OPH DX IMG PST SGM RTA: CPT | Performed by: OPHTHALMOLOGY

## 2023-08-15 PROCEDURE — 92012 INTRM OPH EXAM EST PATIENT: CPT | Performed by: OPHTHALMOLOGY

## 2023-08-15 ASSESSMENT — SUPERFICIAL PUNCTATE KERATITIS (SPK)
OS_SPK: T
OD_SPK: 1+ 2+

## 2023-08-15 ASSESSMENT — VISUAL ACUITY
OD_BCVA: 20/30-1
OS_BCVA: 20/20

## 2023-08-15 ASSESSMENT — REFRACTION_AUTOREFRACTION
OD_SPHERE: +1.50
OS_CYLINDER: -0.25
OD_AXIS: 086
OD_CYLINDER: -0.50
OS_SPHERE: -1.25
OS_AXIS: 135

## 2023-08-15 ASSESSMENT — KERATOMETRY
OS_K2POWER_DIOPTERS: 42.50
OD_K1POWER_DIOPTERS: 42.25
OS_AXISANGLE_DEGREES: 114
OD_AXISANGLE_DEGREES: 108
OS_K1POWER_DIOPTERS: 42.25
OD_K2POWER_DIOPTERS: 43.00

## 2023-08-15 ASSESSMENT — TONOMETRY
OS_IOP_MMHG: 15
OD_IOP_MMHG: 17

## 2023-08-15 ASSESSMENT — AXIALLENGTH_DERIVED
OS_AL: 24.5806
OD_AL: 23.4274

## 2023-08-15 ASSESSMENT — SPHEQUIV_DERIVED
OD_SPHEQUIV: 1.25
OS_SPHEQUIV: -1.375

## 2023-08-15 ASSESSMENT — CONFRONTATIONAL VISUAL FIELD TEST (CVF)
OD_FINDINGS: FULL
OS_FINDINGS: FULL

## 2024-04-03 ENCOUNTER — OFFICE (OUTPATIENT)
Dept: URBAN - METROPOLITAN AREA CLINIC 113 | Facility: CLINIC | Age: 69
Setting detail: OPHTHALMOLOGY
End: 2024-04-03
Payer: COMMERCIAL

## 2024-04-03 DIAGNOSIS — H16.223: ICD-10-CM

## 2024-04-03 DIAGNOSIS — H43.393: ICD-10-CM

## 2024-04-03 DIAGNOSIS — H25.11: ICD-10-CM

## 2024-04-03 DIAGNOSIS — H35.342: ICD-10-CM

## 2024-04-03 PROCEDURE — 92250 FUNDUS PHOTOGRAPHY W/I&R: CPT | Performed by: OPHTHALMOLOGY

## 2024-04-03 PROCEDURE — 99213 OFFICE O/P EST LOW 20 MIN: CPT | Performed by: OPHTHALMOLOGY

## 2024-05-02 ENCOUNTER — OFFICE (OUTPATIENT)
Dept: URBAN - METROPOLITAN AREA CLINIC 94 | Facility: CLINIC | Age: 69
Setting detail: OPHTHALMOLOGY
End: 2024-05-02
Payer: COMMERCIAL

## 2024-05-02 DIAGNOSIS — H35.371: ICD-10-CM

## 2024-05-02 DIAGNOSIS — H25.13: ICD-10-CM

## 2024-05-02 DIAGNOSIS — H25.11: ICD-10-CM

## 2024-05-02 PROCEDURE — 92134 CPTRZ OPH DX IMG PST SGM RTA: CPT | Performed by: OPHTHALMOLOGY

## 2024-05-02 PROCEDURE — 92012 INTRM OPH EXAM EST PATIENT: CPT | Performed by: OPHTHALMOLOGY

## 2024-05-02 PROCEDURE — 92136 OPHTHALMIC BIOMETRY: CPT | Mod: RT | Performed by: OPHTHALMOLOGY

## 2024-05-02 ASSESSMENT — CONFRONTATIONAL VISUAL FIELD TEST (CVF)
OD_FINDINGS: FULL
OS_FINDINGS: FULL

## 2024-05-16 ENCOUNTER — ASC (OUTPATIENT)
Dept: URBAN - METROPOLITAN AREA SURGERY 8 | Facility: SURGERY | Age: 69
Setting detail: OPHTHALMOLOGY
End: 2024-05-16
Payer: COMMERCIAL

## 2024-05-16 DIAGNOSIS — H25.11: ICD-10-CM

## 2024-05-16 DIAGNOSIS — H52.211: ICD-10-CM

## 2024-05-16 PROCEDURE — VIVITY VIVITY: Performed by: OPHTHALMOLOGY

## 2024-05-16 PROCEDURE — 66984 XCAPSL CTRC RMVL W/O ECP: CPT | Mod: 79,RT | Performed by: OPHTHALMOLOGY

## 2024-05-16 PROCEDURE — FEMTO FEMTOSECOND LASER: Mod: GY | Performed by: OPHTHALMOLOGY

## 2024-05-17 ENCOUNTER — RX ONLY (RX ONLY)
Age: 69
End: 2024-05-17

## 2024-05-17 ENCOUNTER — OFFICE (OUTPATIENT)
Dept: URBAN - METROPOLITAN AREA CLINIC 94 | Facility: CLINIC | Age: 69
Setting detail: OPHTHALMOLOGY
End: 2024-05-17
Payer: COMMERCIAL

## 2024-05-17 DIAGNOSIS — Z96.1: ICD-10-CM

## 2024-05-17 PROCEDURE — 99024 POSTOP FOLLOW-UP VISIT: CPT | Performed by: PHYSICIAN ASSISTANT

## 2024-05-17 ASSESSMENT — CONFRONTATIONAL VISUAL FIELD TEST (CVF)
OD_FINDINGS: FULL
OS_FINDINGS: FULL

## 2024-05-22 ENCOUNTER — OFFICE (OUTPATIENT)
Dept: URBAN - METROPOLITAN AREA CLINIC 113 | Facility: CLINIC | Age: 69
Setting detail: OPHTHALMOLOGY
End: 2024-05-22
Payer: COMMERCIAL

## 2024-05-22 DIAGNOSIS — Z96.1: ICD-10-CM

## 2024-05-22 PROBLEM — H26.491 POSTERIOR CAPSULAR OPACIFICATION; RIGHT EYE: Status: ACTIVE | Noted: 2024-05-22

## 2024-05-22 PROBLEM — H35.371 EPIRETINAL MEMBRANE; RIGHT EYE: Status: ACTIVE | Noted: 2024-05-02

## 2024-05-22 PROCEDURE — 99024 POSTOP FOLLOW-UP VISIT: CPT | Performed by: OPTOMETRIST

## 2024-05-22 ASSESSMENT — CONFRONTATIONAL VISUAL FIELD TEST (CVF)
OD_FINDINGS: FULL
OS_FINDINGS: FULL

## 2024-06-12 ENCOUNTER — OFFICE (OUTPATIENT)
Dept: URBAN - METROPOLITAN AREA CLINIC 113 | Facility: CLINIC | Age: 69
Setting detail: OPHTHALMOLOGY
End: 2024-06-12
Payer: COMMERCIAL

## 2024-06-12 DIAGNOSIS — H26.491: ICD-10-CM

## 2024-06-12 DIAGNOSIS — Z96.1: ICD-10-CM

## 2024-06-12 PROCEDURE — 99024 POSTOP FOLLOW-UP VISIT: CPT | Performed by: OPTOMETRIST

## 2024-06-12 ASSESSMENT — CONFRONTATIONAL VISUAL FIELD TEST (CVF)
OS_FINDINGS: FULL
OD_FINDINGS: FULL

## 2024-08-21 ENCOUNTER — OFFICE (OUTPATIENT)
Dept: URBAN - METROPOLITAN AREA CLINIC 105 | Facility: CLINIC | Age: 69
Setting detail: OPHTHALMOLOGY
End: 2024-08-21
Payer: COMMERCIAL

## 2024-08-21 DIAGNOSIS — H35.342: ICD-10-CM

## 2024-08-21 DIAGNOSIS — H35.371: ICD-10-CM

## 2024-08-21 DIAGNOSIS — H43.393: ICD-10-CM

## 2024-08-21 PROCEDURE — 92134 CPTRZ OPH DX IMG PST SGM RTA: CPT | Performed by: OPHTHALMOLOGY

## 2024-08-21 PROCEDURE — 92012 INTRM OPH EXAM EST PATIENT: CPT | Performed by: OPHTHALMOLOGY

## 2024-08-21 ASSESSMENT — CONFRONTATIONAL VISUAL FIELD TEST (CVF)
OD_FINDINGS: FULL
OS_FINDINGS: FULL

## 2024-09-03 ENCOUNTER — OFFICE (OUTPATIENT)
Dept: URBAN - METROPOLITAN AREA CLINIC 112 | Facility: CLINIC | Age: 69
Setting detail: OPHTHALMOLOGY
End: 2024-09-03
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
## 2024-09-03 PROCEDURE — NO SHOW FE NO SHOW FEE: Performed by: OPHTHALMOLOGY

## 2024-09-18 ENCOUNTER — OFFICE (OUTPATIENT)
Dept: URBAN - METROPOLITAN AREA CLINIC 113 | Facility: CLINIC | Age: 69
Setting detail: OPHTHALMOLOGY
End: 2024-09-18
Payer: COMMERCIAL

## 2024-09-18 DIAGNOSIS — H16.223: ICD-10-CM

## 2024-09-18 DIAGNOSIS — H26.491: ICD-10-CM

## 2024-09-18 PROCEDURE — 92014 COMPRE OPH EXAM EST PT 1/>: CPT | Performed by: OPHTHALMOLOGY

## 2024-09-18 ASSESSMENT — CONFRONTATIONAL VISUAL FIELD TEST (CVF)
OS_FINDINGS: FULL
OD_FINDINGS: FULL

## 2024-09-19 ENCOUNTER — RX ONLY (RX ONLY)
Age: 69
End: 2024-09-19

## 2024-10-11 ENCOUNTER — ASC (OUTPATIENT)
Dept: URBAN - METROPOLITAN AREA SURGERY 8 | Facility: SURGERY | Age: 69
Setting detail: OPHTHALMOLOGY
End: 2024-10-11
Payer: COMMERCIAL

## 2024-10-11 ENCOUNTER — RX ONLY (RX ONLY)
Age: 69
End: 2024-10-11

## 2024-10-11 DIAGNOSIS — H26.491: ICD-10-CM

## 2024-10-11 PROCEDURE — 66821 AFTER CATARACT LASER SURGERY: CPT | Mod: RT | Performed by: OPHTHALMOLOGY

## 2024-10-11 ASSESSMENT — SUPERFICIAL PUNCTATE KERATITIS (SPK)
OS_SPK: T
OD_SPK: 1+ 2+

## 2024-10-11 ASSESSMENT — KERATOMETRY
OS_AXISANGLE_DEGREES: 090
OS_K1POWER_DIOPTERS: 42.50
OD_AXISANGLE_DEGREES: 092
OD_K1POWER_DIOPTERS: 42.50
OD_K2POWER_DIOPTERS: 43.00
OS_K2POWER_DIOPTERS: 42.50

## 2024-10-11 ASSESSMENT — VISUAL ACUITY
OS_BCVA: 20/25
OD_BCVA: 20/30

## 2024-10-11 ASSESSMENT — CONFRONTATIONAL VISUAL FIELD TEST (CVF)
OS_FINDINGS: FULL
OD_FINDINGS: FULL

## 2024-10-11 ASSESSMENT — CORNEAL EDEMA CLINICAL DESCRIPTION: OD_CORNEALEDEMA: ABSENT

## 2024-10-11 ASSESSMENT — REFRACTION_AUTOREFRACTION
OS_AXIS: 089
OD_SPHERE: -0.75
OS_SPHERE: -0.25
OS_CYLINDER: -0.50
OD_CYLINDER: -0.50
OD_AXIS: 177

## 2024-10-11 ASSESSMENT — REFRACTION_MANIFEST
OS_VA1: 20/30
OD_CYLINDER: SPH
OD_ADD: +1.50
OS_SPHERE: -0.50
OS_CYLINDER: SPH
OD_VA1: 20/30
OS_ADD: +1.50
OD_SPHERE: -0.75

## 2024-10-26 ENCOUNTER — EMERGENCY (EMERGENCY)
Facility: HOSPITAL | Age: 69
LOS: 1 days | Discharge: HOME | End: 2024-10-26
Attending: STUDENT IN AN ORGANIZED HEALTH CARE EDUCATION/TRAINING PROGRAM
Payer: MEDICARE

## 2024-10-26 VITALS
TEMPERATURE: 98 F | RESPIRATION RATE: 20 BRPM | SYSTOLIC BLOOD PRESSURE: 149 MMHG | HEIGHT: 67 IN | OXYGEN SATURATION: 100 % | HEART RATE: 58 BPM | WEIGHT: 128.97 LBS | DIASTOLIC BLOOD PRESSURE: 91 MMHG

## 2024-10-26 VITALS
HEART RATE: 61 BPM | RESPIRATION RATE: 15 BRPM | TEMPERATURE: 98 F | DIASTOLIC BLOOD PRESSURE: 72 MMHG | SYSTOLIC BLOOD PRESSURE: 120 MMHG | OXYGEN SATURATION: 100 %

## 2024-10-26 DIAGNOSIS — Z90.49 ACQUIRED ABSENCE OF OTHER SPECIFIED PARTS OF DIGESTIVE TRACT: Chronic | ICD-10-CM

## 2024-10-26 PROBLEM — G40.909 EPILEPSY, UNSPECIFIED, NOT INTRACTABLE, WITHOUT STATUS EPILEPTICUS: Chronic | Status: ACTIVE | Noted: 2023-02-19

## 2024-10-26 LAB
ALBUMIN SERPL ELPH-MCNC: 4 G/DL — SIGNIFICANT CHANGE UP (ref 3.3–5.2)
ALP SERPL-CCNC: 67 U/L — SIGNIFICANT CHANGE UP (ref 40–120)
ALT FLD-CCNC: 17 U/L — SIGNIFICANT CHANGE UP
ANION GAP SERPL CALC-SCNC: 13 MMOL/L — SIGNIFICANT CHANGE UP (ref 5–17)
AST SERPL-CCNC: 37 U/L — HIGH
BASOPHILS # BLD AUTO: 0 K/UL — SIGNIFICANT CHANGE UP (ref 0–0.2)
BASOPHILS NFR BLD AUTO: 0 % — SIGNIFICANT CHANGE UP (ref 0–2)
BILIRUB SERPL-MCNC: 0.2 MG/DL — LOW (ref 0.4–2)
BUN SERPL-MCNC: 10.7 MG/DL — SIGNIFICANT CHANGE UP (ref 8–20)
CALCIUM SERPL-MCNC: 8.7 MG/DL — SIGNIFICANT CHANGE UP (ref 8.4–10.5)
CHLORIDE SERPL-SCNC: 99 MMOL/L — SIGNIFICANT CHANGE UP (ref 96–108)
CO2 SERPL-SCNC: 25 MMOL/L — SIGNIFICANT CHANGE UP (ref 22–29)
CREAT SERPL-MCNC: 0.67 MG/DL — SIGNIFICANT CHANGE UP (ref 0.5–1.3)
EGFR: 95 ML/MIN/1.73M2 — SIGNIFICANT CHANGE UP
EGFR: 95 ML/MIN/1.73M2 — SIGNIFICANT CHANGE UP
EOSINOPHIL # BLD AUTO: 0 K/UL — SIGNIFICANT CHANGE UP (ref 0–0.5)
EOSINOPHIL NFR BLD AUTO: 0 % — SIGNIFICANT CHANGE UP (ref 0–6)
GIANT PLATELETS BLD QL SMEAR: PRESENT — SIGNIFICANT CHANGE UP
GLUCOSE SERPL-MCNC: 92 MG/DL — SIGNIFICANT CHANGE UP (ref 70–99)
HCT VFR BLD CALC: 36 % — SIGNIFICANT CHANGE UP (ref 34.5–45)
HGB BLD-MCNC: 12.4 G/DL — SIGNIFICANT CHANGE UP (ref 11.5–15.5)
LYMPHOCYTES # BLD AUTO: 1.62 K/UL — SIGNIFICANT CHANGE UP (ref 1–3.3)
LYMPHOCYTES # BLD AUTO: 13.2 % — SIGNIFICANT CHANGE UP (ref 13–44)
MANUAL SMEAR VERIFICATION: SIGNIFICANT CHANGE UP
MCHC RBC-ENTMCNC: 32 PG — SIGNIFICANT CHANGE UP (ref 27–34)
MCHC RBC-ENTMCNC: 34.4 GM/DL — SIGNIFICANT CHANGE UP (ref 32–36)
MCV RBC AUTO: 92.8 FL — SIGNIFICANT CHANGE UP (ref 80–100)
MONOCYTES # BLD AUTO: 0.75 K/UL — SIGNIFICANT CHANGE UP (ref 0–0.9)
MONOCYTES NFR BLD AUTO: 6.1 % — SIGNIFICANT CHANGE UP (ref 2–14)
NEUTROPHILS # BLD AUTO: 9.81 K/UL — HIGH (ref 1.8–7.4)
NEUTROPHILS NFR BLD AUTO: 79.8 % — HIGH (ref 43–77)
NRBC # BLD: 1 /100 WBCS — HIGH (ref 0–0)
NRBC BLD-RTO: 1 /100 WBCS — HIGH (ref 0–0)
PLAT MORPH BLD: NORMAL — SIGNIFICANT CHANGE UP
PLATELET # BLD AUTO: 234 K/UL — SIGNIFICANT CHANGE UP (ref 150–400)
POLYCHROMASIA BLD QL SMEAR: SLIGHT — SIGNIFICANT CHANGE UP
POTASSIUM SERPL-MCNC: 4.5 MMOL/L — SIGNIFICANT CHANGE UP (ref 3.5–5.3)
POTASSIUM SERPL-SCNC: 4.5 MMOL/L — SIGNIFICANT CHANGE UP (ref 3.5–5.3)
PROT SERPL-MCNC: 6.7 G/DL — SIGNIFICANT CHANGE UP (ref 6.6–8.7)
RBC # BLD: 3.88 M/UL — SIGNIFICANT CHANGE UP (ref 3.8–5.2)
RBC # FLD: 13.8 % — SIGNIFICANT CHANGE UP (ref 10.3–14.5)
RBC BLD AUTO: ABNORMAL
SMUDGE CELLS # BLD: PRESENT — SIGNIFICANT CHANGE UP
SODIUM SERPL-SCNC: 137 MMOL/L — SIGNIFICANT CHANGE UP (ref 135–145)
VARIANT LYMPHS # BLD: 0.9 % — SIGNIFICANT CHANGE UP (ref 0–6)
VARIANT LYMPHS NFR BLD MANUAL: 0.9 % — SIGNIFICANT CHANGE UP (ref 0–6)
WBC # BLD: 12.29 K/UL — HIGH (ref 3.8–10.5)
WBC # FLD AUTO: 12.29 K/UL — HIGH (ref 3.8–10.5)

## 2024-10-26 PROCEDURE — 80053 COMPREHEN METABOLIC PANEL: CPT

## 2024-10-26 PROCEDURE — 84484 ASSAY OF TROPONIN QUANT: CPT

## 2024-10-26 PROCEDURE — 36415 COLL VENOUS BLD VENIPUNCTURE: CPT

## 2024-10-26 PROCEDURE — 99283 EMERGENCY DEPT VISIT LOW MDM: CPT

## 2024-10-26 PROCEDURE — 85025 COMPLETE CBC W/AUTO DIFF WBC: CPT

## 2024-10-26 PROCEDURE — 93010 ELECTROCARDIOGRAM REPORT: CPT

## 2024-10-26 PROCEDURE — 93005 ELECTROCARDIOGRAM TRACING: CPT

## 2024-10-26 PROCEDURE — 99285 EMERGENCY DEPT VISIT HI MDM: CPT

## 2024-10-26 PROCEDURE — 82962 GLUCOSE BLOOD TEST: CPT

## 2024-10-26 RX ADMIN — Medication 1000 MILLILITER(S): at 16:09

## 2024-10-31 DIAGNOSIS — G40.909 EPILEPSY, UNSPECIFIED, NOT INTRACTABLE, WITHOUT STATUS EPILEPTICUS: ICD-10-CM

## 2024-10-31 DIAGNOSIS — Z91.013 ALLERGY TO SEAFOOD: ICD-10-CM

## 2024-10-31 DIAGNOSIS — Z91.040 LATEX ALLERGY STATUS: ICD-10-CM

## 2024-10-31 DIAGNOSIS — R42 DIZZINESS AND GIDDINESS: ICD-10-CM

## 2024-10-31 DIAGNOSIS — R55 SYNCOPE AND COLLAPSE: ICD-10-CM

## 2024-10-31 DIAGNOSIS — R20.2 PARESTHESIA OF SKIN: ICD-10-CM

## 2024-12-12 ENCOUNTER — APPOINTMENT (OUTPATIENT)
Dept: NEUROSURGERY | Facility: CLINIC | Age: 69
End: 2024-12-12

## 2024-12-12 ENCOUNTER — NON-APPOINTMENT (OUTPATIENT)
Age: 69
End: 2024-12-12

## 2024-12-12 VITALS
SYSTOLIC BLOOD PRESSURE: 126 MMHG | OXYGEN SATURATION: 99 % | HEIGHT: 67 IN | HEART RATE: 72 BPM | BODY MASS INDEX: 20.09 KG/M2 | DIASTOLIC BLOOD PRESSURE: 81 MMHG | TEMPERATURE: 97.5 F | WEIGHT: 128 LBS

## 2024-12-12 DIAGNOSIS — I67.1 CEREBRAL ANEURYSM, NONRUPTURED: ICD-10-CM

## 2024-12-12 PROCEDURE — 99215 OFFICE O/P EST HI 40 MIN: CPT

## 2025-01-20 ENCOUNTER — OFFICE (OUTPATIENT)
Dept: URBAN - METROPOLITAN AREA CLINIC 112 | Facility: CLINIC | Age: 70
Setting detail: OPHTHALMOLOGY
End: 2025-01-20
Payer: COMMERCIAL

## 2025-01-20 DIAGNOSIS — H43.393: ICD-10-CM

## 2025-01-20 DIAGNOSIS — Z96.1: ICD-10-CM

## 2025-01-20 DIAGNOSIS — H15.101: ICD-10-CM

## 2025-01-20 PROBLEM — H26.492 POSTERIOR CAPSULE OPACITY; LEFT EYE: Status: ACTIVE | Noted: 2025-01-20

## 2025-01-20 PROCEDURE — 92250 FUNDUS PHOTOGRAPHY W/I&R: CPT | Performed by: OPHTHALMOLOGY

## 2025-01-20 PROCEDURE — 92012 INTRM OPH EXAM EST PATIENT: CPT | Performed by: OPHTHALMOLOGY

## 2025-01-20 ASSESSMENT — REFRACTION_MANIFEST
OD_ADD: +1.50
OD_CYLINDER: SPH
OS_SPHERE: -0.50
OD_SPHERE: -0.75
OD_VA1: 20/30
OS_VA1: 20/30
OS_CYLINDER: SPH
OS_ADD: +1.50

## 2025-01-20 ASSESSMENT — SUPERFICIAL PUNCTATE KERATITIS (SPK)
OS_SPK: T
OD_SPK: 1+ 2+

## 2025-01-20 ASSESSMENT — REFRACTION_AUTOREFRACTION
OD_SPHERE: -0.75
OD_AXIS: 164
OS_SPHERE: -0.25
OS_AXIS: 074
OD_CYLINDER: -0.75
OS_CYLINDER: -0.50

## 2025-01-20 ASSESSMENT — VISUAL ACUITY
OS_BCVA: 20/40
OD_BCVA: 20/50

## 2025-01-20 ASSESSMENT — KERATOMETRY
OS_AXISANGLE_DEGREES: 012
OS_K2POWER_DIOPTERS: 42.50
OD_K1POWER_DIOPTERS: 42.75
OS_K1POWER_DIOPTERS: 42.25
OD_AXISANGLE_DEGREES: 092
OD_K2POWER_DIOPTERS: 43.00

## 2025-01-20 ASSESSMENT — CORNEAL EDEMA CLINICAL DESCRIPTION: OD_CORNEALEDEMA: ABSENT

## 2025-01-20 ASSESSMENT — CONFRONTATIONAL VISUAL FIELD TEST (CVF)
OS_FINDINGS: FULL
OD_FINDINGS: FULL

## 2025-01-20 ASSESSMENT — TONOMETRY
OS_IOP_MMHG: 18
OD_IOP_MMHG: 15

## 2025-01-25 ENCOUNTER — NON-APPOINTMENT (OUTPATIENT)
Age: 70
End: 2025-01-25

## 2025-02-14 ENCOUNTER — OFFICE (OUTPATIENT)
Dept: URBAN - METROPOLITAN AREA CLINIC 105 | Facility: CLINIC | Age: 70
Setting detail: OPHTHALMOLOGY
End: 2025-02-14
Payer: COMMERCIAL

## 2025-02-14 DIAGNOSIS — T15.02XA: ICD-10-CM

## 2025-02-14 PROBLEM — H26.493 POSTERIOR CAPSULAR OPACIFICATION; BOTH EYES: Status: ACTIVE | Noted: 2025-02-14

## 2025-02-14 PROCEDURE — 65222 REMOVE FOREIGN BODY FROM EYE: CPT | Mod: LT | Performed by: OPHTHALMOLOGY

## 2025-02-14 ASSESSMENT — REFRACTION_AUTOREFRACTION
OD_AXIS: 097
OS_CYLINDER: -0.75
OS_SPHERE: -0.25
OS_AXIS: 075
OD_CYLINDER: -0.25
OD_SPHERE: 0.00

## 2025-02-14 ASSESSMENT — KERATOMETRY
OD_K2POWER_DIOPTERS: 43.00
OD_K1POWER_DIOPTERS: 42.25
OS_AXISANGLE_DEGREES: 134
OS_K2POWER_DIOPTERS: 43.00
OS_K1POWER_DIOPTERS: 42.75
OD_AXISANGLE_DEGREES: 072

## 2025-02-14 ASSESSMENT — CONFRONTATIONAL VISUAL FIELD TEST (CVF)
OS_FINDINGS: FULL
OD_FINDINGS: FULL

## 2025-02-14 ASSESSMENT — REFRACTION_MANIFEST
OS_VA1: 20/30
OD_SPHERE: -0.75
OD_ADD: +1.50
OD_CYLINDER: SPH
OS_SPHERE: -0.50
OS_ADD: +1.50
OS_CYLINDER: SPH
OD_VA1: 20/30

## 2025-02-14 ASSESSMENT — SUPERFICIAL PUNCTATE KERATITIS (SPK)
OS_SPK: T
OD_SPK: 1+ 2+

## 2025-02-14 ASSESSMENT — VISUAL ACUITY
OD_BCVA: 20/25+2
OS_BCVA: 20/20

## 2025-02-14 ASSESSMENT — TONOMETRY
OS_IOP_MMHG: 20
OD_IOP_MMHG: 16

## 2025-02-14 ASSESSMENT — CORNEAL EDEMA CLINICAL DESCRIPTION: OD_CORNEALEDEMA: ABSENT

## 2025-03-12 ENCOUNTER — OFFICE (OUTPATIENT)
Dept: URBAN - METROPOLITAN AREA CLINIC 113 | Facility: CLINIC | Age: 70
Setting detail: OPHTHALMOLOGY
End: 2025-03-12
Payer: COMMERCIAL

## 2025-03-12 DIAGNOSIS — H16.223: ICD-10-CM

## 2025-03-12 DIAGNOSIS — H26.492: ICD-10-CM

## 2025-03-12 PROCEDURE — 83861 MICROFLUID ANALY TEARS: CPT | Performed by: OPHTHALMOLOGY

## 2025-03-12 PROCEDURE — 99213 OFFICE O/P EST LOW 20 MIN: CPT | Performed by: OPHTHALMOLOGY

## 2025-03-12 ASSESSMENT — TONOMETRY
OD_IOP_MMHG: 16
OS_IOP_MMHG: 18

## 2025-03-12 ASSESSMENT — CONFRONTATIONAL VISUAL FIELD TEST (CVF)
OD_FINDINGS: FULL
OS_FINDINGS: FULL

## 2025-03-12 ASSESSMENT — SUPERFICIAL PUNCTATE KERATITIS (SPK)
OS_SPK: T
OD_SPK: T

## 2025-03-13 ASSESSMENT — REFRACTION_MANIFEST
OD_SPHERE: -0.75
OD_CYLINDER: SPH
OS_CYLINDER: SPH
OU_VA: 20/20-
OS_VA1: 20/25-
OS_SPHERE: -0.50
OD_VA1: 20/25-

## 2025-03-13 ASSESSMENT — VISUAL ACUITY
OD_BCVA: 20/40
OS_BCVA: 20/30-1

## 2025-03-13 ASSESSMENT — REFRACTION_AUTOREFRACTION
OD_AXIS: 009
OD_SPHERE: -0.50
OS_CYLINDER: -0.50
OS_SPHERE: -0.25
OD_CYLINDER: -1.00
OS_AXIS: 080

## 2025-03-13 ASSESSMENT — KERATOMETRY
OD_AXISANGLE_DEGREES: 094
OS_K2POWER_DIOPTERS: 42.50
OS_AXISANGLE_DEGREES: 090
OD_K2POWER_DIOPTERS: 43.00
OD_K1POWER_DIOPTERS: 42.75
OS_K1POWER_DIOPTERS: 42.50

## 2025-03-13 ASSESSMENT — REFRACTION_CURRENTRX
OS_SPHERE: -0.50
OD_VPRISM_DIRECTION: SV
OD_OVR_VA: 20/
OS_VPRISM_DIRECTION: SV
OD_SPHERE: -0.75
OS_CYLINDER: SPH
OD_CYLINDER: SPH
OS_OVR_VA: 20/